# Patient Record
Sex: FEMALE | Race: WHITE | NOT HISPANIC OR LATINO | Employment: FULL TIME | URBAN - METROPOLITAN AREA
[De-identification: names, ages, dates, MRNs, and addresses within clinical notes are randomized per-mention and may not be internally consistent; named-entity substitution may affect disease eponyms.]

---

## 2017-05-24 ENCOUNTER — OFFICE VISIT (OUTPATIENT)
Dept: FAMILY MEDICINE | Facility: CLINIC | Age: 27
End: 2017-05-24

## 2017-05-24 VITALS
WEIGHT: 98.3 LBS | RESPIRATION RATE: 18 BRPM | SYSTOLIC BLOOD PRESSURE: 104 MMHG | OXYGEN SATURATION: 99 % | DIASTOLIC BLOOD PRESSURE: 69 MMHG | BODY MASS INDEX: 18.57 KG/M2 | HEART RATE: 62 BPM

## 2017-05-24 DIAGNOSIS — N83.00 CYST, OVARY, FOLLICULAR: ICD-10-CM

## 2017-05-24 DIAGNOSIS — Z00.00 ROUTINE GENERAL MEDICAL EXAMINATION AT A HEALTH CARE FACILITY: Primary | ICD-10-CM

## 2017-05-24 DIAGNOSIS — Z00.00 HEALTH CARE MAINTENANCE: ICD-10-CM

## 2017-05-24 DIAGNOSIS — M25.50 MULTIPLE JOINT PAIN: ICD-10-CM

## 2017-05-24 DIAGNOSIS — Z00.00 ROUTINE GENERAL MEDICAL EXAMINATION AT A HEALTH CARE FACILITY: ICD-10-CM

## 2017-05-24 LAB
BASOPHILS # BLD AUTO: 0 10E9/L (ref 0–0.2)
BASOPHILS NFR BLD AUTO: 0.6 %
CRP SERPL-MCNC: 3.5 MG/L (ref 0–8)
DIFFERENTIAL METHOD BLD: NORMAL
EOSINOPHIL # BLD AUTO: 0 10E9/L (ref 0–0.7)
EOSINOPHIL NFR BLD AUTO: 0.4 %
ERYTHROCYTE [DISTWIDTH] IN BLOOD BY AUTOMATED COUNT: 12.2 % (ref 10–15)
ERYTHROCYTE [SEDIMENTATION RATE] IN BLOOD BY WESTERGREN METHOD: 9 MM/H (ref 0–20)
HCT VFR BLD AUTO: 41.5 % (ref 35–47)
HGB BLD-MCNC: 13.7 G/DL (ref 11.7–15.7)
IMM GRANULOCYTES # BLD: 0 10E9/L (ref 0–0.4)
IMM GRANULOCYTES NFR BLD: 0.2 %
LYMPHOCYTES # BLD AUTO: 1.8 10E9/L (ref 0.8–5.3)
LYMPHOCYTES NFR BLD AUTO: 33.5 %
MCH RBC QN AUTO: 29.8 PG (ref 26.5–33)
MCHC RBC AUTO-ENTMCNC: 33 G/DL (ref 31.5–36.5)
MCV RBC AUTO: 90 FL (ref 78–100)
MONOCYTES # BLD AUTO: 0.4 10E9/L (ref 0–1.3)
MONOCYTES NFR BLD AUTO: 6.9 %
NEUTROPHILS # BLD AUTO: 3.1 10E9/L (ref 1.6–8.3)
NEUTROPHILS NFR BLD AUTO: 58.4 %
NRBC # BLD AUTO: 0 10*3/UL
NRBC BLD AUTO-RTO: 0 /100
PLATELET # BLD AUTO: 302 10E9/L (ref 150–450)
RBC # BLD AUTO: 4.6 10E12/L (ref 3.8–5.2)
WBC # BLD AUTO: 5.4 10E9/L (ref 4–11)

## 2017-05-24 RX ORDER — DESOGESTREL AND ETHINYL ESTRADIOL 0.15-0.03
1 KIT ORAL DAILY
Qty: 84 TABLET | Refills: 3 | Status: SHIPPED | OUTPATIENT
Start: 2017-05-24 | End: 2018-08-14

## 2017-05-24 ASSESSMENT — PAIN SCALES - GENERAL: PAINLEVEL: MODERATE PAIN (4)

## 2017-05-24 NOTE — PATIENT INSTRUCTIONS
[facility] Medication Refill Request Information:  * Please contact your pharmacy regarding ANY request for medication refills.  ** [facility] Prescription Fax = [phone]  * Please allow 3 business days for routine medication refills.  * Please allow 5 business days for controlled substance medication refills.     [facility] Test Result notification information:  *You will be notified with in 7-10 days of your appointment day regarding the results of your test.  If you are on MyChart you will be notified as soon as the provider has reviewed the results and signed off on them.    [facility] [phone]

## 2017-05-24 NOTE — MR AVS SNAPSHOT
After Visit Summary   5/24/2017    Pita Tee    MRN: [id number]           Patient Information     Date Of Birth          1990        Visit Information        Provider Department      5/24/2017 3:00 PM Alison Plummer MD [facility]        Today's Diagnoses     Routine general medical examination at a health care facility    -  1    Cyst, ovary, follicular        Multiple joint pain        Health care maintenance          Care Instructions    Primary Care Center Medication Refill Request Information:  * Please contact your pharmacy regarding ANY request for medication refills.  ** [facility] Prescription Fax = [phone]  * Please allow 3 business days for routine medication refills.  * Please allow 5 business days for controlled substance medication refills.     [facility] Center Test Result notification information:  *You will be notified with in 7-10 days of your appointment day regarding the results of your test.  If you are on MyChart you will be notified as soon as the provider has reviewed the results and signed off on them.    Primary Care Center [phone]             Follow-ups after your visit        Your next 10 appointments already scheduled     May 24, 2017  4:15 PM CDT   LAB with  LAB   [facility] ([email or web address])    [address] [place] [id number] [phone]           Patient must bring picture ID.  Patient should be prepared to give a urine specimen  Please do not eat 10-12 hours before your appointment if you are coming in fasting for labs on lipids, cholesterol, or glucose (sugar).  Pregnant women should follow their Care Team instructions. Water with medications is okay. Do not drink coffee or other fluids.   If you have concerns about taking  your medications, please ask at office or if scheduling via Scytl, send a message by clicking on Secure Messaging, Message Your Care Team.               Future tests that were ordered for you today     Open Future Orders        Priority Expected Expires Ordered    Rubeola Antibody IgG Routine 5/24/2017 5/24/2018 5/24/2017    Mumps Antibody IgG Routine 5/24/2017 5/24/2018 5/24/2017    Rubella Antibody IgG Quantitative Routine 5/24/2017 5/24/2018 5/24/2017    Erythrocyte sedimentation rate Routine 5/24/2017 5/24/2018 5/24/2017    CRP inflammation Routine 5/24/2017 5/24/2018 5/24/2017    Lyme Confirm IgG by Immunoblot Routine  5/25/2018 5/24/2017    Rheumatoid factor Routine 5/24/2017 5/24/2018 5/24/2017    Antinuclear antibody screen by EIA Routine 5/24/2017 5/24/2018 5/24/2017    CBC with platelets differential Routine 5/24/2017 6/7/2017 5/24/2017            Who to contact     Please call your clinic at [phone] to:    Ask questions about your health    Make or cancel appointments    Discuss your medicines    Learn about your test results    Speak to your doctor   If you have compliments or concerns about an experience at your clinic, or if you wish to file a complaint, please contact [facility] Patient Relations at [phone] or email us at [email or web address]         Additional Information About Your Visit        [employer] Information     [employer] gives you secure access to your electronic health record. If you see a primary care provider, you can also send messages to your care team and make appointments. If you have questions, please call your primary care clinic.  If you do not have a primary care provider, please call [phone] and they will assist you.      [employer] is an electronic gateway that provides easy, online access to your medical records. With [employer], you can request a clinic appointment, read your test results, renew a prescription or communicate with your care team.     To access your existing account, please contact your [facility] or call [phone] for assistance.         Care EveryWhere ID     This is your Care EveryWhere ID. This could be used by other organizations to access your [place] medical records  [id number]        Your Vitals Were     Pulse Respirations Last Period Pulse Oximetry Breastfeeding? BMI (Body Mass Index)    62 18 05/24/2017 (Exact Date) 99% No 18.57 kg/m2       Blood Pressure from Last 3 Encounters:   05/24/17 104/69   06/29/16 107/70   06/22/16 96/62    Weight from Last 3 Encounters:   05/24/17 44.6 kg (98 lb 4.8 oz)   06/29/16 44.7 kg (98 lb 8 oz)   06/22/16 45.2 kg (99 lb 11.2 oz)                 Today's Medication Changes          These changes are accurate as of: 5/24/17  3:55 PM.  If you have any questions, ask your nurse or doctor.               Stop taking these medicines if you haven't already. Please contact your care team if you have questions.     hydrOXYzine 25 MG tablet   Commonly known as:  ATARAX   Stopped by:  Alison Plummer MD PhD           sertraline 50 MG tablet   Commonly known as:  ZOLOFT   Stopped by:  Alison Plummer MD PhD                Where to get your medicines      These medications were sent to [address] [address]     Phone:  [phone]     desogestrel-ethinyl estradiol 0.15-30 MG-MCG per tablet                Primary Care Provider    Physician No Ref-Primary       No address on file        Thank you!     Thank you for choosing [facility]  for your care. Our goal is always to provide you with excellent care. Hearing back from our patients is one way we can continue to improve our services. Please take a few minutes to complete the written survey that you may receive in the mail after your visit with us. Thank you!             Your Updated Medication List - Protect others around you: Learn how to safely use, store and throw away your medicines at www.disposemymeds.org.          This list is accurate as of:  5/24/17  3:55 PM.  Always use your most recent med list.                   Brand Name Dispense Instructions for use    desogestrel-ethinyl estradiol 0.15-30 MG-MCG per tablet    APRI    84 tablet    Take 1 tablet by mouth daily

## 2017-05-24 NOTE — NURSING NOTE
Chief Complaint   Patient presents with     Physical     Patient is here for annual physical.      Arthritis     Patient would like to be checked for osteoarthritis.      Angelica Aguilar LPN at 3:08 PM on 5/24/2017.

## 2017-05-24 NOTE — PROGRESS NOTES
REASON for VISIT:annual - joint pain    HPI   Pita Tee is a 27 year old female who is here for a preventive examination.She is Gr0P0 - LMP was 5/23/17.  On BCP - getting periods with them.  Wants to stay on them.  No vaginal itching, burning or discharge.  Last pap was 6/2016 and normal.  STI testing negative.    She has been having knee discomfort for a few months - getting worse.  Almost every day. Also started to have pain in joints both hands.  No joint swelling. No cracking of knees - no instability.  No new exercise.  Taking ibuprofen - takes 2 at a time and helps some.  No trauma.  Sister has osteoarthritis  - age 17.  Mother has RA dx was last year.      Hx of FABRICIO.  Now off zoloft - didn't feel it helped.  Is doing CBT and seems to be working.  No panic attacks     Patient also questioning her measles, mumps and rubella status. Has had 2 MMR's last one in 2000    Past Medical History:   Diagnosis Date     Dysmenorrhea      Generalised anxiety disorder     Dx age 17; on zoloft for 2 years     IBS (irritable bowel syndrome)     constipation predominant     Migraines      Nephrolithiasis          Current Outpatient Prescriptions   Medication Sig Dispense Refill     desogestrel-ethinyl estradiol (APRI) 0.15-30 MG-MCG per tablet Take 1 tablet by mouth daily 84 tablet 3       Social History   Substance Use Topics     Smoking status: Never Smoker     Smokeless tobacco: Never Used     Alcohol use Yes      Comment: social     Social History     Social History     Marital status: Single     Spouse name: N/A     Number of children: N/A     Years of education: N/A     Occupational History     retail clerical [employer]     Social History Main Topics     Smoking status: Never Smoker     Smokeless tobacco: Never Used     Alcohol use Yes      Comment: social     Drug use: No     Sexual activity: Not Currently     Partners: Female, Male     Birth control/ protection: OCP     Other Topics Concern     Caffeine Concern Yes      2-3 cups/day     Sleep Concern Yes     trouble falling asleep     Stress Concern No     Exercise Yes     walks to work - 1 mile/day      Bike Helmet Yes     Seat Belt Yes     Social History Narrative    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 Maternal Grandfather      MI     DIABETES Paternal Grandfather      Arthritis Sister      Anxiety Disorder Mother      Neurologic Disorder Mother      Migraines     Neurologic Disorder Maternal Grandmother      Possible Lewy body     Breast Cancer Other      CANCER Other      Ovarian          Review Of Systems  General:no fever, no chills  Skin: negative  Eyes: negative  Ears/Nose/Throat: nasal congestion, environmental allergies   Respiratory: No shortness of breath, dyspnea on exertion, cough, or hemoptysis  Cardiovascular: negative  Gastrointestinal: negative  Genitourinary: negative  Musculoskeletal: as above, joint pain and joint stiffness  Neurologic: negative  Psychiatric: hx of anxiety - off meds now  Hematologic/Lymphatic/Immunologic: negative  Endocrine: negative    OBJECTIVE:  /69  Pulse 62  Resp 18  Wt 44.6 kg (98 lb 4.8 oz)  LMP 05/24/2017 (Exact Date)  SpO2 99%  Breastfeeding? No  BMI 18.57 kg/m2  Gen:  Pleasant young woman in NAD  HEENT: PERRL fundi benign  Ears clear with good light reflex.  OP MMM no lesions. Teeth crowded and in poor condition  No drainage  Neck  Supple.  Thyroid not palpable   No carotid bruits.  No LAD  CVS exam: S1, S2 normal, no murmur, click, rub or gallop, regular rate and rhythm, chest is clear without rales or wheezing, no pedal edema, no JVD, no hepatosplenomegaly.  Abd Soft ND NT  BS present  No masses or HSM  Ext   Good pulses. No edema  Musculoskeletal: small joints both hands minimal swelling - good grasp and flexion and extension of fingers, wrists - no swelling full ROM, knees minimal swelling  - full ROM  - spine is straight,  no deformity  BREAST:  normal without suspicious masses, skin changes or axillary nodes, symmetric fibrous changes in both upper outer quadrants   Pelvic exam: deferred - having menses and exam and pap last year were normal and no symptoms - pt did not want a pelvic   Rectal exam: deferred   Neuro: Neurological exam reveals normal without focal findings, mental status, speech normal, alert and oriented x iii, NIKO, fundi are normal, cranial nerves 2-12 intact, muscle tone and strength normal and symmetric, reflexes normal and symmetric.      ASSESSMENT: Young female comes in for annual and has concern for joint pain in hands and knees    (Z00.00) Routine general medical examination at a health care facility  (primary encounter diagnosis)  Comment: pap not indicated, due in 2 yrs - STI testing not indicated and pt does not want it,  talked about calcium and taking a multivit with folate. Immunizations UTD, wants to stay on BC - refill   Plan: desogestrel-ethinyl estradiol (APRI) 0.15-30         MG-MCG per tablet,     (N83.00) Cyst, ovary, follicular  Comment: hx of a cyst - wants to stay on BC  Plan: desogestrel-ethinyl estradiol (APRI) 0.15-30         MG-MCG per tablet,     (M25.50) Multiple joint pain  Comment: questionable etiology - possible RA, or lymes although no tick bite - will do several inflammatory markers.   Plan:, Erythrocyte sedimentation         rate, CRP inflammation, Lyme Confirm IgG by         Immunoblot, Rheumatoid factor, Antinuclear         antibody screen by EIA, CBC with platelets         Differential,    (Z00.00) Health care maintenance  Comment:  Will check ser  y for MMR. Other immunizations UTD   Plan: Rubella Antibody IgG         Quantitative, Rubeola Antibody IgG, Mumps         Antibody IgG  All serology is positive to suggest she is immune     Alison Plummer MD, PhD

## 2017-05-25 LAB
ANA SER QL IA: 1.1
MEV IGG SER QL IA: 4.8 AI (ref 0–0.8)
MUV IGG SER QL IA: 2.2 AI (ref 0–0.8)
RHEUMATOID FACT SER NEPH-ACNC: <20 IU/ML (ref 0–20)
RUBV IGG SERPL IA-ACNC: 110 IU/ML

## 2017-05-27 LAB — B BURGDOR IGG SER QL IB: NORMAL

## 2017-05-31 ENCOUNTER — MYC MEDICAL ADVICE (OUTPATIENT)
Dept: FAMILY MEDICINE | Facility: CLINIC | Age: 27
End: 2017-05-31

## 2018-07-31 ASSESSMENT — ENCOUNTER SYMPTOMS
PARALYSIS: 0
MYALGIAS: 1
NUMBNESS: 0
SEIZURES: 0
JOINT SWELLING: 0
LOSS OF CONSCIOUSNESS: 0
TREMORS: 0
DISTURBANCES IN COORDINATION: 0
HEADACHES: 1
DIZZINESS: 0
BACK PAIN: 1
MUSCLE CRAMPS: 0
NECK PAIN: 0
MUSCLE WEAKNESS: 0
WEAKNESS: 0
MEMORY LOSS: 0
STIFFNESS: 0
SPEECH CHANGE: 0
TINGLING: 0
ARTHRALGIAS: 1

## 2018-08-14 ENCOUNTER — OFFICE VISIT (OUTPATIENT)
Dept: FAMILY MEDICINE | Facility: CLINIC | Age: 28
End: 2018-08-14
Payer: COMMERCIAL

## 2018-08-14 VITALS
BODY MASS INDEX: 17.85 KG/M2 | DIASTOLIC BLOOD PRESSURE: 66 MMHG | WEIGHT: 97 LBS | SYSTOLIC BLOOD PRESSURE: 104 MMHG | HEART RATE: 69 BPM | HEIGHT: 62 IN

## 2018-08-14 DIAGNOSIS — N83.00 CYST, OVARY, FOLLICULAR: ICD-10-CM

## 2018-08-14 DIAGNOSIS — Z00.00 ROUTINE GENERAL MEDICAL EXAMINATION AT A HEALTH CARE FACILITY: ICD-10-CM

## 2018-08-14 DIAGNOSIS — M25.50 MULTIPLE JOINT PAIN: ICD-10-CM

## 2018-08-14 DIAGNOSIS — Z00.00 HEALTH CARE MAINTENANCE: ICD-10-CM

## 2018-08-14 RX ORDER — DESOGESTREL AND ETHINYL ESTRADIOL 0.15-0.03
1 KIT ORAL DAILY
Qty: 84 TABLET | Refills: 3 | Status: SHIPPED | OUTPATIENT
Start: 2018-08-14 | End: 2019-08-19

## 2018-08-14 ASSESSMENT — PAIN SCALES - GENERAL: PAINLEVEL: NO PAIN (0)

## 2018-08-14 NOTE — PROGRESS NOTES
[facility]  Primary Care Center   Baldemar Mercado MD  08/14/2018      Chief Complaint:   Physical       History of Present Illness:   Pita Tee is a 28 year old female with a history of anxiety who presents for a physical. She denies urinary, bowel or other symptoms at this time.    Concerns discussed:   -The patient is starting an exercise program. She is here with concerns for intermittent knee pain which she describes as diffuse. The pain is worse with prolonged standing or walking. She has occasional stiffness in the morning    -She was told she has elevated pressure in her eyes and is concerned that it may be related to her migraines. For her migraines she uses Advil, ice and rest. She has had migraines since 13 and has a major episode once per month. She has frequent tension headaches for which she uses caffeine.     -Birth control refill. She uses [facility]    Review of Systems:   Pertinent items are noted in HPI, remainder of complete ROS is negative.      Active Medications:     Current Outpatient Prescriptions:      desogestrel-ethinyl estradiol (APRI) 0.15-30 MG-MCG per tablet, Take 1 tablet by mouth daily, Disp: 84 tablet, Rfl: 3     Ibuprofen (ADVIL PO), Take by mouth as needed for moderate pain, Disp: , Rfl:      [DISCONTINUED] desogestrel-ethinyl estradiol (APRI) 0.15-30 MG-MCG per tablet, Take 1 tablet by mouth daily, Disp: 84 tablet, Rfl: 3      Allergies:   Macrobid [nitrofurantoin]      Past Medical History:  Dysmenorrhea  Generalized anxiety disorder  Irritable bowel syndrome  Migraines  Nephrolithiasis     Past Surgical History:  C removal of kidney stone    Family History:   Hypertension - maternal grandfather  Coronary artery disease - maternal grandfather  Diabetes - paternal grandfather  Arthritis - sister  Anxiety disorder - mother  Neurologic disorder - mother  Breast cancer - other  Ovarian cancer - other     Social History:   Smoking status: never smoker  Smokeless tobacco:  "never  Alcohol use: socially  The patient formerly worked at the [employer] and is switching jobs     Physical Exam:   /66  Pulse 69  Ht 1.562 m (5' 1.5\")  Wt 44 kg (97 lb)  LMP 08/07/2018  Breastfeeding? No  BMI 18.03 kg/m2   Constitutional: Alert. In no distress.  Head: Normocephalic. No masses, lesions, tenderness or abnormalities.  ENT: No neck nodes or sinus tenderness.  Cardiovascular: RRR. No murmurs, clicks, gallops, or rub.  Respiratory: Clear to auscultation bilaterally, no wheezes or crackles.  Gastrointestinal: Abdomen soft. Non-tender. BS normal. No masses or organomegaly.  Musculoskeletal: Extremities normal. No gross deformities noted. Normal muscle tone.  Skin: No suspicious lesions. No rashes.  Neurologic: Gait normal. Reflexes normal and symmetric. Sensation grossly WNL.  Psychiatric: Mentation appears normal. Normal affect.   Hematologic/Lymphatic/Immunologic: Normal cervical lymph nodes.        Assessment and Plan:      Multiple joint pain--see walk in [facility] (described how to see) prn    Health care maintenance  Lipids and sugar normal 2 years ago. She has just started an exercise program with a . Tetanus up to date. The patient will continue to follow with her eye doctor for routine exams and to monitor eye pressure     Birth Control  Tolerates birth control well with no side effects. Recheck in 1 year, PAP smear then.   - desogestrel-ethinyl estradiol (APRI) 0.15-30 MG-MCG per tablet  Dispense: 84 tablet; Refill: 3    Migraines and tension headaches  She manages with caffeine and motrin. I did offer a course of physical therapy and she will think about it.     Follow-up: As needed     Scribe Disclosure:   I, Hector Sanchez, am serving as a scribe to document services personally performed by Baldemar Mercado MD at this visit, based upon the provider's statements to me. All documentation has been reviewed by the aforementioned provider prior to being entered into the " official medical record.     Portions of this medical record were completed by a scribe. UPON MY REVIEW AND AUTHENTICATION BY ELECTRONIC SIGNATURE, this confirms (a) I performed the applicable clinical services, and (b) the record is accurate.       Answers for HPI/ROS submitted by the patient on 7/31/2018   General Symptoms: No  Skin Symptoms: No  HENT Symptoms: No  EYE SYMPTOMS: No  HEART SYMPTOMS: No  LUNG SYMPTOMS: No  INTESTINAL SYMPTOMS: No  URINARY SYMPTOMS: No  GYNECOLOGIC SYMPTOMS: No  BREAST SYMPTOMS: No  SKELETAL SYMPTOMS: Yes  BLOOD SYMPTOMS: No  NERVOUS SYSTEM SYMPTOMS: Yes  MENTAL HEALTH SYMPTOMS: No  Back pain: Yes  Muscle aches: Yes  Neck pain: No  Swollen joints: No  Joint pain: Yes  Bone pain: No  Muscle cramps: No  Muscle weakness: No  Joint stiffness: No  Bone fracture: No  Trouble with coordination: No  Dizziness or trouble with balance: No  Fainting or black-out spells: No  Memory loss: No  Headache: Yes  Seizures: No  Speech problems: No  Tingling: No  Tremor: No  Weakness: No  Difficulty walking: No  Paralysis: No  Numbness: No  Baldemar Mercado MD

## 2018-08-14 NOTE — MR AVS SNAPSHOT
"              After Visit Summary   8/14/2018    Pita Tee    MRN: [id number]           Patient Information     Date Of Birth          1990        Visit Information        Provider Department      8/14/2018 11:45 AM Baldemar Mercado MD [facility]        Today's Diagnoses     Cyst, ovary, follicular        Routine general medical examination at a health care facility        Multiple joint pain        Health care maintenance           Follow-ups after your visit        Who to contact     Please call your clinic at [phone] to:    Ask questions about your health    Make or cancel appointments    Discuss your medicines    Learn about your test results    Speak to your doctor            Additional Information About Your Visit        [employer] Information     [email or web address] gives you secure access to your electronic health record. If you see a primary care provider, you can also send messages to your care team and make appointments. If you have questions, please call your primary care clinic.  If you do not have a primary care provider, please call [phone] and they will assist you.      [email or web address] is an electronic gateway that provides easy, online access to your medical records. With [email or web address], you can request a clinic appointment, read your test results, renew a prescription or communicate with your care team.     To access your existing account, please contact your [facility] or call [phone] for assistance.        Care EveryWhere ID     This is your Care EveryWhere ID. This could be used by other organizations to access your [place] medical records  [id number]        Your Vitals Were     Pulse Height Last Period Breastfeeding? BMI (Body Mass Index)       69 1.562 m (5' 1.5\") 08/07/2018 No 18.03 kg/m2        Blood Pressure from Last 3 Encounters:   08/14/18 104/66   05/24/17 104/69   06/29/16 107/70    Weight from Last 3 Encounters:   08/14/18 44 kg (97 " lb)   05/24/17 44.6 kg (98 lb 4.8 oz)   06/29/16 44.7 kg (98 lb 8 oz)              Today, you had the following     No orders found for display         Where to get your medicines      These medications were sent to [facility] [place], [place] - [address] [place] [address], [facility] [address]     Phone:  [phone]     desogestrel-ethinyl estradiol 0.15-30 MG-MCG per tablet          Primary Care Provider Fax #    Physician No Ref-Primary [phone]       No address on file        Equal Access to Services     [facility] Hospital[facility] C : Hadii dede swanson Somike, waaxda mary, qaybtarah kaalmayana maldonado, sheree kat . [facility] [phone].    ATENCIÓN: Si habla español, tiene a dalton disposición servicios gratuitos de asistencia lingüística. Ll[facility] [phone].    We comply with applicable federal civil rights laws and [place] laws. We do not discriminate on the basis of race, color, national origin, age, disability, sex, sexual orientation, or gender identity.            Thank you!     Thank you for choosing [facility]  for your care. Our goal is always to provide you with excellent care. Hearing back from our patients is one way we can continue to improve our services. Please take a few minutes to complete the written survey that you may receive in the mail after your visit with us. Thank you!             Your Updated Medication List - Protect others around you: Learn how to safely use, store and throw away your medicines at www.disposemymeds.org.          This list is accurate as of 8/14/18 11:59 AM.  Always use your most recent med list.                   Brand Name Dispense Instructions for use Diagnosis    ADVIL PO      Take by mouth as needed for moderate pain        desogestrel-ethinyl estradiol 0.15-30 MG-MCG per tablet    APRI    84 tablet    Take 1 tablet by mouth daily    Cyst, ovary, follicular, Routine general medical examination  at a health care facility, Multiple joint pain, Health care maintenance

## 2019-04-01 ENCOUNTER — OFFICE VISIT (OUTPATIENT)
Dept: FAMILY MEDICINE | Facility: CLINIC | Age: 29
End: 2019-04-01
Payer: COMMERCIAL

## 2019-04-01 VITALS
HEART RATE: 78 BPM | WEIGHT: 98.5 LBS | OXYGEN SATURATION: 97 % | BODY MASS INDEX: 18.31 KG/M2 | SYSTOLIC BLOOD PRESSURE: 106 MMHG | DIASTOLIC BLOOD PRESSURE: 74 MMHG

## 2019-04-01 DIAGNOSIS — G47.10 HYPERSOMNOLENCE: Primary | ICD-10-CM

## 2019-04-01 RX ORDER — FEXOFENADINE HCL 180 MG/1
180 TABLET ORAL PRN
COMMUNITY
End: 2022-08-08

## 2019-04-01 ASSESSMENT — PAIN SCALES - GENERAL: PAINLEVEL: NO PAIN (0)

## 2019-04-01 NOTE — PROGRESS NOTES
[facility]  Primary Care Center   Baldemar Mercado MD  04/01/2019      Chief Complaint:   Fatigue    History of Present Illness:   Pita Tee is a 29 year old female with a history of generalized anxiety disorder, irritable bowel syndrome, migraines, nephrolithiasis, and dysmenorrhea who presents for evaluation of sleep issues.    Non-restorative sleep  The patient reports she sleeps 10 hours/night and wakes groggy, which is more than she feels she should sleep, and has been ongoing for at least 12 years. It takes an hour or more to fall asleep, per patient. She experiences uncommon night waking, but is able to fall back asleep following these. She reports taking a breath (but not gasp) when waking. She reports a family history of sleep apnea. She has not been told that she snores, but has not had a roommate in years. 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 She drinks alcohol rarely and denies drug use. She does not feel depressed. She has not had sinus or nasal, adenoid issues or surgeries. She feels sleepy throughout the day. She denies falling asleep in traffic or unintentionally. She reports anxious thoughts while falling asleep. She denies weight changes, chills, fever.    Review of Systems:   Pertinent items are noted in HPI, remainder of complete ROS is negative.      Active Medications:      desogestrel-ethinyl estradiol (APRI) 0.15-30 MG-MCG per tablet, Take 1 tablet by mouth daily, Disp: 84 tablet, Rfl: 3     fexofenadine (ALLEGRA) 180 MG tablet, Take 180 mg by mouth as needed for allergies, Disp: , Rfl:      Ibuprofen (ADVIL PO), Take by mouth as needed for moderate pain, Disp: , Rfl:       Allergies:   Macrobid [nitrofurantoin]      Past Medical History:  Dysmenorrhea  Generalized anxiety disorder (age 17), on Zoloft for 2 years  Irritable bowel syndrome, constipation predominant  Migraines  Nephrolithiasis     Past Surgical  History:  Removal of kidney stone    Family History:   Mother - anxiety disorder, migraines  Maternal grandfather - hypertension, MI, CAD  Maternal grandmother - possible Lewy body  Paternal grandfather - diabetes  Sister - arthritis     Social History:   The patient was alone.  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 In no distress.  Head: The scalp, face, and head appear normal.  Musculoskeletal: Extremities appear normal. No gross deformities noted.   Neurologic: Gait normal. Speech is normal and fluent.  Psychiatric: Mentation appears normal. Normal affect.     PHQ-9: 5  FABRICIO-7: 9    Past laboratory results  Component      Latest Ref Rng & Units 1/26/2016   WBC      4.0 - 11.0 10e9/L 7.8   RBC Count      3.8 - 5.2 10e12/L 4.08   Hemoglobin      11.7 - 15.7 g/dL 11.9   Hematocrit      35.0 - 47.0 % 36.4   MCV      78 - 100 fl 89   MCH      26.5 - 33.0 pg 29.2   MCHC      31.5 - 36.5 g/dL 32.7   RDW      10.0 - 15.0 % 12.6   Platelet Count      150 - 450 10e9/L 268   Diff Method       Automated Method   % Neutrophils      % 72.1   % Lymphocytes      % 20.2   % Monocytes      % 6.3   % Eosinophils      % 1.0   % Basophils      % 0.3   % Immature Granulocytes      % 0.1   Nucleated RBCs      0 /100 0   Absolute Neutrophil      1.6 - 8.3 10e9/L 5.6   Absolute Lymphocytes      0.8 - 5.3 10e9/L 1.6   Absolute Monocytes      0.0 - 1.3 10e9/L 0.5   Absolute Eosinophils      0.0 - 0.7 10e9/L 0.1   Absolute Basophils      0.0 - 0.2 10e9/L 0.0   Abs Immature Granulocytes      0 - 0.4 10e9/L 0.0   Absolute Nucleated RBC       0.0   Sodium      133 - 144 mmol/L 139   Potassium      3.4 - 5.3 mmol/L 4.0   Chloride      94 - 109 mmol/L 106   Carbon Dioxide      20 - 32 mmol/L 29    Anion Gap      3 - 14 mmol/L 4   Glucose      70 - 99 mg/dL 82   Urea Nitrogen      7 - 30 mg/dL 13   Creatinine      0.52 - 1.04 mg/dL 0.88   GFR Estimate      >60 mL/min/1.7m2 77   GFR Estimate If Black      >60 mL/min/1.7m2 >90 . . .   Calcium      8.5 - 10.1 mg/dL 8.4 (L)   Bilirubin Total      0.2 - 1.3 mg/dL 0.6   Albumin      3.4 - 5.0 g/dL 4.0   Protein Total      6.8 - 8.8 g/dL 7.2   Alkaline Phosphatase      40 - 150 U/L 38 (L)   ALT      0 - 50 U/L 14   AST      0 - 45 U/L 10   TSH      0.40 - 4.00 mU/L 0.57        Assessment and Plan:  [id number]  There could be an element of mood disturbance though that may be an element of primary sleep problem as she has felt this way for 12 years. CBC, thyroid and Comprehensive metabolics are normal I did not repeat labs today. If her sleep test is normal I advised her to follow up to see me to reconsider labs and pursuing mood disturbance.  - SLEEP EVALUATION & MANAGEMENT REFERRAL - ADULT -Other (Respond in commments) ([facility])       Follow-up: Data Unavailable         Scribe Disclosure:  I, José Escalona, am serving as a scribe to document services personally performed by Baldemar Mercado MD at this visit, based upon the provider's statements to me. All documentation has been reviewed by the aforementioned provider prior to being entered into the official medical record.     Portions of this medical record were completed by a scribe. UPON MY REVIEW AND AUTHENTICATION BY ELECTRONIC SIGNATURE, this confirms (a) I performed the applicable clinical services, and (b) the record is accurate.   Baldemar Mercado MD

## 2019-04-01 NOTE — NURSING NOTE
Chief Complaint   Patient presents with     Fatigue     Pt is c/o of exessive fatigue and sleeping a lot        Carol Rainey, Clinic EMT at 9:43 AM on 4/1/2019

## 2019-04-01 NOTE — PATIENT INSTRUCTIONS
Primary Care Center Phone Number: [phone]   Primary Care Center Medication Refill Request Information:  * Please contact your pharmacy regarding ANY request for medication refills.  ** [facility] Prescription Fax = [phone]  * Please allow 3 business days for routine medication refills.  * Please allow 5 business days for controlled substance medication refills.     Primary [facility] Test Result notification information:  *You will be notified with in 7-10 days of your appointment day regarding the results of your test.  If you are on MyChart you will be notified as soon as the provider has reviewed the results and signed off on them.

## 2019-05-03 ENCOUNTER — PRE VISIT (OUTPATIENT)
Dept: SLEEP MEDICINE | Facility: CLINIC | Age: 29
End: 2019-05-03

## 2019-05-03 NOTE — TELEPHONE ENCOUNTER
"  1.  Reason for the visit:The patient reports she sleeps 10 hours/night and wakes groggy, which is more than she feels she should sleep, and has been ongoing for at least 12 years. It takes an hour or more to fall asleep, per patient. She experiences uncommon night waking, but is able to fall back asleep following these. She reports taking a breath (but not gasp) when waking. She reports a family history of sleep apnea. She has not been told that she snores, but has not had a roommate in years. 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 She drinks alcohol rarely and denies drug use. She does not feel depressed. She has not had sinus or nasal, adenoid issues or surgeries. She feels sleepy throughout the day. She denies falling asleep in traffic or unintentionally. She reports anxious thoughts while falling asleep. She denies weight changes, chills, fever.  2.  Referring provider and clinic name:  Dr. Mercado [facility]  3.  Previous Sleep Doctor or Pulmonlogist (clinic name)?  None  4.  Records, Procedures, Imaging, and Labs (see below)  No records to obtain        All NOTES from previous office visits that pertain to why they are being seen in the Sleep Center    Previous Sleep Studies, Chest CT, Echos and reports that pertain to why they are seeing Sleep Center    All Sleep records that have been done in the last 2 years that pertain to why they are seeing Sleep Center            Are they being seen for continuation of care for Cpap/Bipap/Avap/Trilogy/Dental Device? none    If yes to above Who and Where was Device issued/currently getting supplies from? na    Are you currently on \"Supplemental Oxygen\" during the day or night?   na                                                                                                                                                      Please remind pt to bring Cpap machine and ask to arrive " "15 minutes early to appointment due traffic and congestion                                                 5. Pt Sleep Center Packet received Pt has completed and will bring to appt    Yes: \"please make sure that you bring this to your appointment completed, either the doctor will not see you until this completed or you may be asked to reschedule your appointment.\"     No: mail or email to the pt and explain, \"please make sure that you bring this to your appointment completed, either the doctor will not see you until this completed or you may be asked to reschedule your appointment.\"     ~If pt coming early to fill packet out, ask that they come 30 minutes prior to their appointment~     6. Has the pt's medication list been updated and preferred pharmacy added?     7. Has the allergy list been reviewed?    \"Thank you for choosing [facility] and we look forward to seeing you at your upcoming appointment\"     "

## 2019-05-06 ENCOUNTER — OFFICE VISIT (OUTPATIENT)
Dept: SLEEP MEDICINE | Facility: CLINIC | Age: 29
End: 2019-05-06
Attending: FAMILY MEDICINE
Payer: COMMERCIAL

## 2019-05-06 VITALS
WEIGHT: 97 LBS | DIASTOLIC BLOOD PRESSURE: 69 MMHG | RESPIRATION RATE: 16 BRPM | HEIGHT: 62 IN | BODY MASS INDEX: 17.85 KG/M2 | HEART RATE: 69 BPM | OXYGEN SATURATION: 96 % | SYSTOLIC BLOOD PRESSURE: 105 MMHG

## 2019-05-06 DIAGNOSIS — G47.10 HYPERSOMNOLENCE: ICD-10-CM

## 2019-05-06 DIAGNOSIS — F51.04 PSYCHOPHYSIOLOGICAL INSOMNIA: Primary | ICD-10-CM

## 2019-05-06 DIAGNOSIS — R53.83 FATIGUE, UNSPECIFIED TYPE: ICD-10-CM

## 2019-05-06 PROCEDURE — 99205 OFFICE O/P NEW HI 60 MIN: CPT | Performed by: INTERNAL MEDICINE

## 2019-05-06 ASSESSMENT — MIFFLIN-ST. JEOR: SCORE: 1110.3

## 2019-05-06 NOTE — PROGRESS NOTES
Sleep Consultation Note:    Date on this visit: 5/6/2019    Pita Tee is sent by Baldemar Mercado for a sleep consultation regarding nonrestorative sleep  Primary Physician: Baldemar Mercado     Chief complaint: Nonrestorative sleep(sleep 9 to 10 hours per night still do not feel rested)    Pita Tee 29 year old with PMH migraine headaches, generalized anxiety disorder,  who complains of nonrestorative sleep(sleep 9 to 10 hours per night still do not feel rested)  This has been going on for at least 12 years, since she was in high school.  She has not had a previous sleep study.    She sleeps alone and hence there isn't anyone to report whether or not she snores or has any observed apneas during sleep.  She denies snort arousals or awakenings due to gasping for air or choking.  She reports morning headaches almost on a daily basis.  She reports nasal congestion and dry mouth.  She  is a mouth breather.  She sleeps on her back and on her sides.    She works as a , works from home and her work hours can be between 2-10 PM or 3 to 11 PM.    During work days , she goes to bed at 11:30 PM and wakes up at 10:30 AM.  During weekends, she goes to bed at 10:30 PM and wakes up at 10 AM.  She reports difficulty falling asleep.   It takes 1 to 1-1/2 hours for her to fall asleep.  Active mind, anxiety, ruminating thoughts and chronic pain(upper back and headaches) affect her sleep.  She also reports some  anxiety about her ability to fall asleep.  Depression was a concern in the past, but not currently.  She reports 0-3 nocturnal awakenings due to bad or weird dreams, or wrong temperature.  She is able to resume sleep within 1 to 2 minutes after the nocturnal awakening.     On average she reports getting 9 to 10 hours of sleep per night.   She would be a night person if she is left on her own, and reports that when she was in high school and college, not working she would go to bed at 2 AM and wake up  at 11 AM.    She reports nonrestorative sleep and fatigue but denies excessive daytime sleepiness.  She endorses an [place] sleepiness score of 7 out of 24.  She denies drowsiness while driving.  She reports that the fatigue makes her work somewhat sloppy.  She naps once a week for 1 to 1-1/2 hours and does not feel rested upon awakening from nap  She does not use electronics in bed.    She reports have habitual foot tapping which she can consciously control and her symptoms do not suggest RLS.    She reports occasional nightmares.  She used to sleep walk  during childhood but not as an adult.  She denies sleepwalking or dream enactment behavior.    She reports bruxism does not use a mouthguard, is getting dental work done.    She denies cataplexy or hallucinations but reports occasional episodes of sleep paralysis.      Social History  Pita currently works as a .  Work schedule is as follows:  2-10 PM or 3 to 11 PM.    She does drink caffeine, 3 cups of coffee /day . Last caffeine intake is not within 6 hours of bed time.  She drinks alcohol very occasionally.  She has used alcohol a couple times about 2 years ago as a sleep aid but realized that it worsened her sleep and since then she stopped using it a sleep aid.    Patient is a never smoker.  Patient does not use drugs.    Allergies:    Allergies   Allergen Reactions     Macrobid [Nitrofurantoin]      headache         Medications:    Current Outpatient Medications   Medication Sig Dispense Refill     desogestrel-ethinyl estradiol (APRI) 0.15-30 MG-MCG per tablet Take 1 tablet by mouth daily 84 tablet 3     fexofenadine (ALLEGRA) 180 MG tablet Take 180 mg by mouth as needed for allergies       Ibuprofen (ADVIL PO) Take by mouth as needed for moderate pain         Problem List:  There are no active problems to display for this patient.       Past Medical/Surgical History:  Past Medical History:   Diagnosis Date     Dysmenorrhea      Generalised  anxiety disorder     Dx age 17; on zoloft for 2 years     IBS (irritable bowel syndrome)     constipation predominant     Migraines      Nephrolithiasis      Past Surgical History:   Procedure Laterality Date     C REMOVAL OF KIDNEY STONE         Social History:  Social History     Socioeconomic History     Marital status: Single     Spouse name: Not on file     Number of children: Not on file     Years of education: Not on file     Highest education level: Not on file   Occupational History     Occupation: retail clerical     Employer: [employer]   Social Needs     Financial resource strain: Not on file     Food insecurity:     Worry: Not on file     Inability: Not on file     Transportation needs:     Medical: Not on file     Non-medical: Not on file   Tobacco Use     Smoking status: Never Smoker     Smokeless tobacco: Never Used   Substance and Sexual Activity     Alcohol use: Yes     Comment: social     Drug use: No     Sexual activity: Not Currently     Partners: Female, Male     Birth control/protection: OCP   Lifestyle     Physical activity:     Days per week: Not on file     Minutes per session: Not on file     Stress: Not on file   Relationships     Social connections:     Talks on phone: Not on file     Gets together: Not on file     Attends Restoration service: Not on file     Active member of club or organization: Not on file     Attends meetings of clubs or organizations: Not on file     Relationship status: Not on file     Intimate partner violence:     Fear of current or ex partner: Not on file     Emotionally abused: Not on file     Physically abused: Not on file     Forced sexual activity: Not on file   Other Topics Concern     Parent/sibling w/ CABG, MI or angioplasty before 65F 55M? 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 Maternal Grandfather         MI     Diabetes Paternal Grandfather      Arthritis Sister      Anxiety Disorder Mother      Neurologic Disorder Mother         Migraines     Neurologic Disorder Maternal Grandmother         Possible Lewy body     Breast Cancer Other      Cancer Other         Ovarian       Review of Systems:  A complete review of systems reviewed by me is negative with the exeption of what has been mentioned in the history of present illness and symptoms listed below, highlighted in red:  CONSTITUTIONAL: NEGATIVE for weight gain/loss, fever, chills, sweats or night sweats  EYES: NEGATIVE for changes in vision, blind spots, double vision.  ENT: NEGATIVE for ear pain, sore throat, sinus pain, post-nasal drip, runny nose, bloody nose  CARDIAC: NEGATIVE for fast heartbeats or fluttering in chest, chest pain or pressure, breathlessness when lying flat, swollen legs or swollen feet.  NEUROLOGIC:  headaches, NEGATIVE for weakness or numbness in the arms or legs.  DERMATOLOGIC: NEGATIVE for rashes, new moles or change in mole(s)  PULMONARY: NEGATIVE SOB at rest, SOB with activity, dry cough, productive cough, coughing up blood, wheezing or whistling when breathing.    GASTROINTESTINAL: NEGATIVE for nausea or vomitting, loose or watery stools, fat or grease in stools, constipation, abdominal pain, bowel movements black in color or blood noted.  GENITOURINARY: NEGATIVE for pain during  "urination, blood in urine, urinating more frequently than usual  MUSCULOSKELETAL: Chronic upper back pain and headaches  ENDOCRINE: NEGATIVE for increased thirst or urination, diabetes.  LYMPHATIC: NEGATIVE for swollen lymph nodes, lumps or bumps in the breasts or nipple discharge.  PSYCHE:  Anxiety; NEGATIVE for depression,    Physical Examination:  Vitals: /69   Pulse 69   Resp 16   Ht 1.562 m (5' 1.5\")   Wt 44 kg (97 lb)   SpO2 96%   BMI 18.03 kg/m    BMI= Body mass index is 18.03 kg/m .    Neck Cir (cm): 29 cm    [place] Total Score 5/6/2019   Total score - [place] 7       General: No apparent distress, appropriately groomed  Head: Normocephalic, atraumatic  Eyes: no icterus, PERRL  Nose: Hypertrophied inferior nasal turbinate with decreased airflow through the right nostril.  Mouth: op pink and moist, teeth:overbite  Orophraynx: Opening is narrowed, uvula: Short   Mallampati Class: II, narrow Velopharynx   Tonsillar Stage: 1+   Neck: Supple, Circumference: 11-1/2 inches  Cardiac: Regular rate and rhythm  Chest: Symmetric air movement, lungs clear to auscultation bilaterally  Musculoskeletal:no edema noted  Skin: Warm, dry, intact  Psych: Mood pleasant, affect congruent  Neuro:  Mental status: Awake, alert, attentive, oriented.  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  Strongly encouraged to follow good sleep hygiene/behavioral techniques. She would be a good candidate for Cognitive Behavioral Therapy, for the psychophysiological insomnia. Referral to sleep psychologist has been placed. Patient will bring sleep logs to the CBT-I appointment.   We discussed stimulus control measures.  She was instructed to go to bed closer " "to 2 AM which  is her habitual sleep time,  minimizing exposure to bright lights at least a couple hours before the bedtime and avoid mind stimulating activities before bed.  Anxiety: We discussed the  association of  insomnia and anxiety and recommended to follow-up with her primary care provider for optimizing the management of anxiety.      Nonrestorative sleep, fatigue, possible MERLY: she sleeps alone and hence there isn't anyone to report snoring or observed apneas during sleep. However, she reports symptoms of nonrestorative sleep, daily morning headaches and dry mouth. Possible MERLY based on  these symptoms including nasal and oropharyngeal anatomy and positive family history for MERLY. Recommend obtaining supervised polysomnography to evaluate for possible sleep apnea.   STOP BANG score is 1.   Recommend in-lab sleep study as she is low risk for MERLY.  Diagnosis and treatment for MERLY have been discussed. Complications of untreated MERLY have also been discussed.    Patient is a life long non-smoker and has been encouraged to continue to not smoke.    Encouraged  healthy diet, and exercise.    Patient was strongly advised to avoid driving, operating any heavy machinery or other hazardous situations while drowsy or sleepy.  Patient was counseled on the importance of driving while alert, to pull over if drowsy, or nap before getting into the vehicle if sleepy.        Plan is to follow up via virtual visit in 1 week after PSG is completed to review results and discuss plan of care.        CC: Baldemar Mercado    The above note was dictated using voice recognition software. Although reviewed after completion, some word and grammatical error may remain . Please contact the author for any clarifications.    \"I spent a total of 60 minutes face to face with Pita Tee during today's office visit. Over 50% of this time was spent counseling the patient and  coordinating care regarding insomnia, sleep hygiene, delayed " "sleep phase, stimulus control, CBT I, anxiety, sleep apnea, polysomnography.\"       Any Armas MD   of Medicine,  Division of Pulmonary/Sleep Medicine  [facility].                "

## 2019-05-06 NOTE — PATIENT INSTRUCTIONS
[facility]      Treating Insomnia  Good sleeping habits are a key part of treatment. If needed, some medications may help you sleep better at first. Making healthy lifestyle changes and learning to relax can improve your sleep. Treating insomnia takes commitment, but trust that your efforts will pay off. Talk to your doctor before taking any medication.    Healthy Lifestyle  Your lifestyle affects your health and your sleep. Here are some healthy habits:    Keep a regular sleep schedule. Go to bed and get up at the same time each day.    Exercise regularly. It may help you reduce stress. Avoid strenuous exercise for two to four hours before bedtime.    Avoid or limit naps.    Use your bed only for sleep and sex.    Don t spend too much time in bed trying to fall asleep. If you can t fall asleep, get up and do something until you become tired and drowsy.    Avoid or limit caffeine and nicotine. They can keep you awake at night. Also avoid alcohol. It may help you fall asleep at first, but your sleep will not be restful.    Before Bedtime  To sleep better every night, try these tips:    Have a bedtime routine to let your body and mind know when it s time to sleep.    Going to bed should be relaxing so try to do only relaxing things around bedtime. Sleep will come sooner.    If your worries don t let you sleep, write them down in a diary. Then close it, and go to bed.    Make sure the room is not too hot or too cold. If it s not dark enough, an eye mask can help. If it s noisy, try using earplugs.    Learn to Relax  Stress, anxiety, and body tension may keep you awake at night. To unwind before bedtime, try reading a book, meditation, or yoga. Also, try the following:    Deep breathing. Sit or lie back in a chair. Take a slow, deep breath. Hold it for 5 counts. Then breathe out slowly through your mouth. Keep doing this until you feel relaxed.    Imagery. Think of the last fun trip you took. In your  mind, walk through the trip from start to finish. Put as much detail into the memory as you can remember. It will help you relax.    Cognitive Behavioral Treatment (CBT)  CBT is the most effective treatment for long-term insomnia. It tries to address the underlying causes of your sleep problems, including your habits and how you think about sleep.      Individual Therapy   James Campos    SLEEP PSYCHOLOGIST    Online Programs     www.[employer] (pronounced shut eye). There is a fee for this program. Enter the code  [place]  if you decide to enroll in this program.      www.sleepIO.com (pronounced sleep ee oh). There is a fee for this program. Enter the code  [place]  if you decide to enroll in this program.     Suggested Resources  Insomnia Treatment Books     Overcoming Insomnia by Joseph Romero and Filomena Valderrama (2008)    No More Sleepless Nights by Goldy Prince and Manda Valentine (1996)    Say Prema to Insomnia by Candelario Barajas (2009)    The Insomnia Workbook by Coco Turner and Amador Matthew (2009)    The Insomnia Answer by Mason Rodriguez and Ben Goldstein (2006)      Stress Management and Relaxation Books    The Relaxation and Stress Reduction Workbook by Emily Black, Shyla Del Toro and Henry Villegas (2008)    Stress Management Workbook: Techniques and Self-Assessment Procedures by Rebecca Chaparro and Bandar Kathleen (1997)    A Mindfulness-Based Stress Reduction Workbook by Joshua Baumann and Aleena Pearson (2010)    The Complete Stress Management Workbook by Chris Nolasco, Matheus Fall and Lobo Reynolds (1996)    Assert Yourself by Nidhi Alcala and Randell Alcala (1977)    Relaxation Resources for Computer Download   These websites offer resources to help you relax. This list is for information only. [employer] is not responsible for the quality of services or the actions of any person or organization.  Progressive Muscle Relaxation (PMR):      http://www.[employer]progressive-muscle-relaxation-exercise.html     http://studentsupport.[facility]/counseling/resources/self-help/relaxation-and-stress-management/   Deep Breathing Exercises:    http://www.[employer]breathing-awareness.html     Meditation:     wwwfitogram    www.[employer]meditation-site.Ufree You may have to pay for some of these resources.    Guided Imagery:    http://www.[employer]guided-imagery-scripts.html     http://[employer]/library/lqekmpiiav-hridsh-auzwlfy/     Counseling / Behavioral Health  [facility]  Visit www.[facility].org or call [phone] to find a clinic close to you.      This is not a prescription and these resources are optional. You must pay for any costs when using these resources. Please ask your insurance carrier if you can be reimbursed for these resources. If so, you are responsible for sending the needed details to your insurance carrier. These resources may also be tax deductible as medical expenses. Check with your .     These programs and publications are not affiliated in any way with [employer].          How does cognitive behavioral therapy for insomnia work?  The cognitive side of cognitive behavioral therapy for insomnia teaches you to recognize and change beliefs that affect your ability to sleep. The behavioral part of cognitive behavioral therapy for insomnia helps you develop good sleep habits and avoid behaviors that keep you from sleeping well.   Cognitive behavioral therapy for insomnia contains one or more of the following elements:   Sleep education. To make effective changes, it's important to understand the basics of sleep -- for example, understanding sleep cycles and learning how beliefs, behaviors and outside factors can affect your sleep.   Cognitive control and psychotherapy. This type of therapy helps you control or eliminate negative  thoughts and worries that keep you awake. It may also involve eliminating false or worrisome beliefs about sleep, such as the idea that a single restless night will make you sick.   Sleep restriction. Lying in bed when you're awake can become a habit that leads to poor sleep. Limiting the amount of time you spend in bed can make you sleepier when you do go to bed. That way you're more likely to fall asleep and stay asleep.   Remaining passively awake. This involves avoiding any effort to fall asleep. Paradoxically, worrying that you can't sleep can actually keep you awake. Letting go of this worry can help you relax and make it easier to fall asleep.   Stimulus control therapy. This method helps remove factors that condition the mind to resist sleep. For example, you might be coached to use the bed only for sleep and sex, and to leave the bedroom if you can't go to sleep within 15 minutes.   Sleep hygiene. This method of therapy involves changing basic lifestyle habits that influence sleep, such as smoking or drinking too much caffeine late in the day, drinking too much alcohol, or not getting regular exercise. You may be told to avoid napping and taught to maintain a consistent sleep schedule. It also includes tips that help you sleep better, such as ways to wind down an hour or two before bedtime.   Relaxation training. This method helps you calm your mind and body. Approaches include meditation, hypnosis and muscle relaxation.   Biofeedback. This method allows you to observe biological signs such as heart rate and muscle tension. Your sleep specialist may have you take a biofeedback device home to record your daily patterns. This information can help identify patterns that affect sleep.   Sleep diary. To understand how to best treat your insomnia, your sleep therapist may have you keep a detailed sleep diary for one to two weeks. In the diary, you'll write down when you go to bed, when you get up, how much time  "you spend in bed unable to sleep, total sleep time and other details about your sleep patterns.   The most effective treatment approach may combine several of these methods.   Cognitive behavioral therapy vs. pills  Some newer sleeping medications have been approved for long-term use. But they may not be the best long-term insomnia treatment.   Sleep medications can be a very effective short-term treatment -- for example, they can provide immediate relief during a period of high stress or grief.   Cognitive behavioral therapy for insomnia may be a good treatment choice if you have long-term sleep problems. You may want to try it if you're worried about becoming dependent on sleep medications, if medications aren't effective or if they cause bothersome side effects.   Unlike pills, cognitive behavioral therapy for insomnia addresses the underlying causes of insomnia rather than just relieving symptoms. But it takes time -- and effort -- to make it work. In some cases, a combination of sleep medication and cognitive behavioral therapy for insomnia may be the best approach.      -I                  MY TREATMENT INFORMATION FOR SLEEP APNEA-  Pita Tee    DOCTOR : [facility]  SLEEP CENTER :      MY CONTACT NUMBER:     Am I having a sleep study at a sleep center?  Make sure you have an appointment for the study before you leave!    Am I having a home sleep study?  Watch this video:  https://www.youTraveDocube.com/watch?v=CteI_GhyP9g&list=[email or web address]  Please verify your insurance coverage with your insurance carrier    Frequently asked questions:  1. What is Obstructive Sleep Apnea (MERLY)? MERLY is the most common type of sleep apnea. Apnea means, \"without breath.\"  Apnea is most often caused by narrowing or collapse of the upper airway as muscles relax during sleep.   Almost everyone has occasional apneas. Most people with sleep apnea have had brief interruptions at night " frequently for many years.  The severity of sleep apnea is related to how frequent and severe the events are.   2. What are the consequences of MERLY? Symptoms include: feeling sleepy during the day, snoring loudly, gasping or stopping of breathing, trouble sleeping, and occasionally morning headaches or heartburn at night.  Sleepiness can be serious and even increase the risk of falling asleep while driving. Other health consequences may include development of high blood pressure and other cardiovascular disease in persons who are susceptible. Untreated MERLY  can contribute to heart disease, stroke and diabetes.   3. What are the treatment options? In most situations, sleep apnea is a lifelong disease that must be managed with daily therapy. Medications are not effective for sleep apnea and surgery is generally not considered until other therapies have been tried. Your treatment is your choice . Continuous Positive Airway (CPAP) works right away and is the therapy that is effective in nearly everyone. An oral device to hold your jaw forward is usually the next most reliable option. Other options include postioning devices (to keep you off your back), weight loss, and surgery including a tongue pacing device. There is more detail about some of these options below.    Important tips for using CPAP and similar devices   Know your equipment:  CPAP is continuous positive airway pressure that prevents obstructive sleep apnea by keeping the throat from collapsing while you are sleeping. In most cases, the device is  smart  and can slowly self-adjusts if your throat collapses and keeps a record every day of how well you are treated-this information is available to you and your care team.  BPAP is bilevel positive airway pressure that keeps your throat open and also assists each breath with a pressure boost to maintain adequate breathing.  Special kinds of BPAP are used in patients who have inadequate breathing from lung or  heart disease. In most cases, the device is  smart  and can slowly self-adjusts to assist breathing. Like CPAP, the device keeps a record of how well you are treated.  Your mask is your connection to the device. You get to choose what feels most comfortable and the staff will help to make sure if fits. Here: are some examples of the different masks that are available:       Key points to remember on your journey with sleep apnea:  1. Sleep study.  PAP devices often need to be adjusted during a sleep study to show that they are effective and adjusted right.  2. Good tips to remember: Try wearing just the mask during a quiet time during the day so your body adapts to wearing it. A humidifier is recommended for comfort in most cases to prevent drying of your nose and throat. Allergy medication from your provider may help you if you are having nasal congestion.  3. Getting settled-in. It takes more than one night for most of us to get used to wearing a mask. Try wearing just the mask during a quiet time during the day so your body adapts to wearing it. A humidifier is recommended for comfort in most cases. Our team will work with you carefully on the first day and will be in contact within 4 days and again at 2 and 4 weeks for advice and remote device adjustments. Your therapy is evaluated by the device each day.   4. Use it every night. The more you are able to sleep naturally for 7-8 hours, the more likely you will have good sleep and to prevent health risks or symptoms from sleep apnea. Even if you use it 4 hours it helps. Occasionally all of us are unable to use a medical therapy, in sleep apnea, it is not dangerous to miss one night.   5. Communicate. Call our skilled team on the number provided on the first day if your visit for problems that make it difficult to wear the device. Over 2 out of 3 patients can learn to wear the device long-term with help from our team. Remember to call our team or your sleep  providers if you are unable to wear the device as we may have other solutions for those who cannot adapt to mask CPAP therapy. It is recommended that you sleep your sleep provider within the first 3 months and yearly after that if you are not having problems.   6. Use it for your health. We encourage use of CPAP masks during daytime quiet periods to allow your face and brain to adapt to the sensation of CPAP so that it will be a more natural sensation to awaken to at night or during naps. This can be very useful during the first few weeks or months of adapting to CPAP though it does not help medically to wear CPAP during wakefulness and  should not be used as a strategy just to meet guidelines.  7. Take care of your equipment. Make sure you clean your mask and tubing using directions every day and that your filter and mask are replaced as recommended or if they are not working.     BESIDES CPAP, WHAT OTHER THERAPIES ARE THERE?    Positioning Device  Positioning devices are generally used when sleep apnea is mild and only occurs on your back.This example shows a pillow that straps around the waist. It may be appropriate for those whose sleep study shows milder sleep apnea that occurs primarily when lying flat on one's back. Preliminary studies have shown benefit but effectiveness at home may need to be verified by a home sleep test. These devices are generally not covered by medical insurance.  Examples of devices that maintain sleeping on the back to prevent snoring and mild sleep apnea.    Belt type body positioner  Http://Moxtra.6Rooms/    Electronic reminder  Http://nightshifttherapy.com/  Http://www.3point5.com.com.au/      Oral Appliance  What is oral appliance therapy?  An oral appliance device fits on your teeth at night like a retainer used after having braces. The device is made by a specialized dentist and requires several visits over 1-2 months before a manufactured device is made to fit your teeth and is  adjusted to prevent your sleep apnea. Once an oral device is working properly, snoring should be improved. A home sleep test may be recommended at that time if to determine whether the sleep apnea is adequately treated.       Some things to remember:  -Oral devices are often, but not always, covered by your medical insurance. Be sure to check with your insurance provider.   -If you are referred for oral therapy, you will be given a list of specialized dentists to consider or you may choose to visit the Web site of the American Academy of Dental Sleep Medicine  -Oral devices are less likely to work if you have severe sleep apnea or are extremely overweight.     More detailed information  An oral appliance is a small acrylic device that fits over the upper and lower teeth  (similar to a retainer or a mouth guard). This device slightly moves jaw forward, which moves the base of the tongue forward, opens the airway, improves breathing for effective treat snoring and obstructive sleep apnea in perhaps 7 out of 10 people .  The best working devices are custom-made by a dental device  after a mold is made of the teeth 1, 2, 3.  When is an oral appliance indicated?  Oral appliance therapy is recommended as a first-line treatment for patients with primary snoring, mild sleep apnea, and for patients with moderate sleep apnea who prefer appliance therapy to use of CPAP4, 5. Severity of sleep apnea is determined by sleep testing and is based on the number of respiratory events per hour of sleep.   How successful is oral appliance therapy?  The success rate of oral appliance therapy in patients with mild sleep apnea is 75-80% while in patients with moderate sleep apnea it is 50-70%. The chance of success in patients with severe sleep apnea is 40-50%. The research also shows that oral appliances have a beneficial effect on the cardiovascular health of MERLY patients at the same magnitude as CPAP therapy7.  Oral  appliances should be a second-line treatment in cases of severe sleep apnea, but if not completely successful then a combination therapy utilizing CPAP plus oral appliance therapy may be effective. Oral appliances tend to be effective in a broad range of patients although studies show that the patients who have the highest success are females, younger patients, those with milder disease, and less severe obesity. 3, 6.   Finding a dentist that practices dental sleep medicine  Specific training is available through the American Academy of Dental Sleep Medicine for dentists interested in working in the field of sleep. To find a dentist who is educated in the field of sleep and the use of oral appliances, near you, visit the Web site of the American Academy of Dental Sleep Medicine.    References  1. Donnell et al. Objectively measured vs self-reported compliance during oral appliance therapy for sleep-disordered breathing. Chest 2013; 144(5): 1776-0383.  2. Rebecca et al. Objective measurement of compliance during oral appliance therapy for sleep-disordered breathing. Thorax 2013; 68(1): 91-96.  3. Cristobal et al. Mandibular advancement devices in 620 men and women with MERLY and snoring: tolerability and predictors of treatment success. Chest 2004; 125: 1990-3430.  4. David, et al. Oral appliances for snoring and MERLY: a review. Sleep 2006; 29: 244-262.  5. James et al. Oral appliance treatment for MERLY: an update. J Clin Sleep Med 2014; 10(2): 215-227.  6. Thang et al. Predictors of OSAH treatment outcome. J Dent Res 2007; 86: 0351-7238.      Weight Loss:    Weight loss is a long-term strategy that may improve sleep apnea in some patients.    Weight management is a personal decision and the decision should be based on your interest and the potential benefits.  If you are interested in exploring weight loss strategies, the following discussion covers the impact on weight loss on sleep apnea and the  approaches that may be successful.    Being overweight does not necessarily mean you will have health consequences.  Those who have BMI over 35 or over 27 with existing medical conditions carries greater risk.   Weight loss decreases severity of sleep apnea in most people with obesity. For those with mild obesity who have developed snoring with weight gain, even 15-30 pound weight loss can improve and occasionally eliminate sleep apnea.  Structured and life-long dietary and health habits are necessary to lose weight and keep healthier weight levels.     Though there may be significant health benefits from weight loss, long-term weight loss is very difficult to achieve- studies show success with dietary management in less than 10% of people. In addition, substantial weight loss may require years of dietary control and may be difficult if patients have severe obesity. In these cases, surgical management may be considered.  Finally, older individuals who have tolerated obesity without health complications may be less likely to benefit from weight loss strategies.        Your BMI is Body mass index is 18.03 kg/m .  Weight management is a personal decision.  If you are interested in exploring weight loss strategies, the following discussion covers the approaches that may be successful. Body mass index (BMI) is one way to tell whether you are at a healthy weight, overweight, or obese. It measures your weight in relation to your height.  A BMI of 18.5 to 24.9 is in the healthy range. A person with a BMI of 25 to 29.9 is considered overweight, and someone with a BMI of 30 or greater is considered obese. More than two-thirds of American adults are considered overweight or obese.  Being overweight or obese increases the risk for further weight gain. Excess weight may lead to heart disease and diabetes.  Creating and following plans for healthy eating and physical activity may help you improve your health.  Weight control is  part of healthy lifestyle and includes exercise, emotional health, and healthy eating habits. Careful eating habits lifelong are the mainstay of weight control. Though there are significant health benefits from weight loss, long-term weight loss with diet alone may be very difficult to achieve- studies show long-term success with dietary management in less than 10% of people. Attaining a healthy weight may be especially difficult to achieve in those with severe obesity. In some cases, medications, devices and surgical management might be considered.  What can you do?  If you are overweight or obese and are interested in methods for weight loss, you should discuss this with your provider.     Consider reducing daily calorie intake by 500 calories.     Keep a food journal.     Avoiding skipping meals, consider cutting portions instead.    Diet combined with exercise helps maintain muscle while optimizing fat loss. Strength training is particularly important for building and maintaining muscle mass. Exercise helps reduce stress, increase energy, and improves fitness. Increasing exercise without diet control, however, may not burn enough calories to loose weight.       Start walking three days a week 10-20 minutes at a time    Work towards walking thirty minutes five days a week     Eventually, increase the speed of your walking for 1-2 minutes at time    In addition, we recommend that you review healthy lifestyles and methods for weight loss available through the National Institutes of Health patient information sites:  http://win.niddk.nih.gov/publications/index.htm    And look into health and wellness programs that may be available through your health insurance provider, employer, local community center, or gianni club.          Surgery:    Surgery for obstructive sleep apnea is considered generally only when other therapies fail to work. Surgery may be discussed with you if you are having a difficult time tolerating  CPAP and or when there is an abnormal structure that requires surgical correction.  Nose and throat surgeries often enlarge the airway to prevent collapse.  Most of these surgeries create pain for 1-2 weeks and up to half of the most common surgeries are not effective throughout life.  You should carefully discuss the benefits and drawbacks to surgery with your sleep provider and surgeon to determine if it is the best solution for you.   More information  Surgery for MERLY is directed at areas that are responsible for narrowing or complete obstruction of the airway during sleep.  There are a wide range of procedures available to enlarge and/or stabilize the airway to prevent blockage of breathing in the three major areas where it can occur: the palate, tongue, and nasal regions.  Successful surgical treatment depends on the accurate identification of the factors responsible for obstructive sleep apnea in each person.  A personalized approach is required because there is no single treatment that works well for everyone.  Because of anatomic variation, consultation with an examination by a sleep surgeon is a critical first step in determining what surgical options are best for each patient.  In some cases, examination during sedation may be recommended in order to guide the selection of procedures.  Patients will be counseled about risks and benefits as well as the typical recovery course after surgery. Surgery is typically not a cure for a person s MERLY.  However, surgery will often significantly improve one s MERLY severity (termed  success rate ).  Even in the absence of a cure, surgery will decrease the cardiovascular risk associated with OSA7; improve overall quality of life8 (sleepiness, functionality, sleep quality, etc).      Palate Procedures:  Patients with MERLY often have narrowing of their airway in the region of their tonsils and uvula.  The goals of palate procedures are to widen the airway in this region as  well as to help the tissues resist collapse.  Modern palate procedure techniques focus on tissue conservation and soft tissue rearrangement, rather than tissue removal.  Often the uvula is preserved in this procedure. Residual sleep apnea is common in patient after pharyngoplasty with an average reduction in sleep apnea events of 33%2.      Tongue Procedures:  ExamWhile patients are awake, the muscles that surround the throat are active and keep this region open for breathing. These muscles relax during sleep, allowing the tongue and other structures to collapse and block breathing.  There are several different tongue procedures available.  Selection of a tongue base procedure depends on characteristics seen on physical exam.  Generally, procedures are aimed at removing bulky tissues in this area or preventing the back of the tongue from falling back during sleep.  Success rates for tongue surgery range from 50-62%3.    Hypoglossal Nerve Stimulation:  Hypoglossal nerve stimulation has recently received approval from the [place] Food and Drug Administration for the treatment of obstructive sleep apnea.  This is based on research showing that the system was safe and effective in treating sleep apnea6.  Results showed that the median AHI score decreased 68%, from 29.3 to 9.0. This therapy uses an implant system that senses breathing patterns and delivers mild stimulation to airway muscles, which keeps the airway open during sleep.  The system consists of three fully implanted components: a small generator (similar in size to a pacemaker), a breathing sensor, and a stimulation lead.  Using a small handheld remote, a patient turns the therapy on before bed and off upon awakening.    Candidates for this device must be greater than 22 years of age, have moderate to severe MERLY (AHI between 20-65), BMI less than 32, have tried CPAP/oral appliance without success, and have appropriate upper airway anatomy (determined by  a sleep endoscopy performed by Dr. Araya).    Hypoglossal Nerve Stimulation Pathway:    The sleep surgeon s office will work with the patient through the insurance prior-authorization process (including communications and appeals).    Nasal Procedures:  Nasal obstruction can interfere with nasal breathing during the day and night.  Studies have shown that relief of nasal obstruction can improve the ability of some patients to tolerate positive airway pressure therapy for obstructive sleep apnea1.  Treatment options include medications such as nasal saline, topical corticosteroid and antihistamine sprays, and oral medications such as antihistamines or decongestants. Non-surgical treatments can include external nasal dilators for selected patients. If these are not successful by themselves, surgery can improve the nasal airway either alone or in combination with these other options.      Combination Procedures:  Combination of surgical procedures and other treatments may be recommended, particularly if patients have more than one area of narrowing or persistent positional disease.  The success rate of combination surgery ranges from 66-80%2,3.    References  1. Salas SALAZAR. The Role of the Nose in Snoring and Obstructive Sleep Apnoea: An Update.  Eur Arch Otorhinolaryngol. 2011; 268: 1365-73.  2.  Lluvia SM; Desire JA; Sergio JR; Pallanch JF; Nina MB; Ingris SG; Dina PARR. Surgical modifications of the upper airway for obstructive sleep apnea in adults: a systematic review and meta-analysis. SLEEP 2010;33(10):0339-7556. Joshua LUNDBERG. Hypopharyngeal surgery in obstructive sleep apnea: an evidence-based medicine review.  Arch Otolaryngol Head Neck Surg. 2006 Feb;132(2):206-13.  3. Laureano YH1, Abhi Y, Phil STEFANY. The efficacy of anatomically based multilevel surgery for obstructive sleep apnea. Otolaryngol Head Neck Surg. 2003 Oct;129(4):327-35.  4. Joshua LUNDBERG, Goldberg A. Hypopharyngeal Surgery in Obstructive Sleep Apnea:  An Evidence-Based Medicine Review. Arch Otolaryngol Head Neck Surg. 2006 Feb;132(2):206-13.  5. Jamee PJ et al. Upper-Airway Stimulation for Obstructive Sleep Apnea.  N Engl J Med. 2014 Jan 9;370(2):139-49.  6. Austin Y et al. Increased Incidence of Cardiovascular Disease in Middle-aged Men with Obstructive Sleep Apnea. Am J Respir Crit Care Med; 2002 166: 159-165  7. Estrada MO et al. Studying Life Effects and Effectiveness of Palatopharyngoplasty (SLEEP) study: Subjective Outcomes of Isolated Uvulopalatopharyngoplasty. Otolaryngol Head Neck Surg. 2011; 144: 623-631.    Follow up with your primary care provider for optimizing management of anxiety and the chronic pain

## 2019-08-12 ASSESSMENT — ENCOUNTER SYMPTOMS
CHILLS: 0
WEIGHT LOSS: 0
POLYDIPSIA: 0
POLYPHAGIA: 0
DECREASED APPETITE: 0
WEIGHT GAIN: 0
FATIGUE: 1
ALTERED TEMPERATURE REGULATION: 0
HALLUCINATIONS: 0
NIGHT SWEATS: 0
INCREASED ENERGY: 0
FEVER: 0

## 2019-08-19 ENCOUNTER — OFFICE VISIT (OUTPATIENT)
Dept: FAMILY MEDICINE | Facility: CLINIC | Age: 29
End: 2019-08-19
Payer: COMMERCIAL

## 2019-08-19 VITALS
OXYGEN SATURATION: 99 % | BODY MASS INDEX: 17.7 KG/M2 | TEMPERATURE: 98.4 F | DIASTOLIC BLOOD PRESSURE: 65 MMHG | WEIGHT: 96.2 LBS | SYSTOLIC BLOOD PRESSURE: 98 MMHG | HEIGHT: 62 IN | HEART RATE: 66 BPM | RESPIRATION RATE: 17 BRPM

## 2019-08-19 DIAGNOSIS — Z00.00 HEALTH CARE MAINTENANCE: ICD-10-CM

## 2019-08-19 DIAGNOSIS — M25.50 MULTIPLE JOINT PAIN: ICD-10-CM

## 2019-08-19 DIAGNOSIS — N83.00 CYST, OVARY, FOLLICULAR: ICD-10-CM

## 2019-08-19 DIAGNOSIS — Z00.00 ROUTINE GENERAL MEDICAL EXAMINATION AT A HEALTH CARE FACILITY: ICD-10-CM

## 2019-08-19 LAB
CHOLEST SERPL-MCNC: 196 MG/DL
HDLC SERPL-MCNC: 68 MG/DL
LDLC SERPL CALC-MCNC: 100 MG/DL
NONHDLC SERPL-MCNC: 128 MG/DL
TRIGL SERPL-MCNC: 141 MG/DL

## 2019-08-19 RX ORDER — DESOGESTREL AND ETHINYL ESTRADIOL 0.15-0.03
1 KIT ORAL DAILY
Qty: 84 TABLET | Refills: 3 | Status: SHIPPED | OUTPATIENT
Start: 2019-08-19 | End: 2019-08-26

## 2019-08-19 ASSESSMENT — MIFFLIN-ST. JEOR: SCORE: 1106.67

## 2019-08-19 ASSESSMENT — PAIN SCALES - GENERAL: PAINLEVEL: NO PAIN (0)

## 2019-08-19 NOTE — PROGRESS NOTES
[facility]   Baldemar Mercaod MD  08/19/2019      Chief Complaint:   Physical       History of Present Illness:   Pita Tee is a 29 year old female with a history of anxiety, IBS, and migraines who presents for an annual physical. She would like to discuss her current birth control pill. She is wondering if there is any hormone pill that has high enough estrogen to prevent ovarian cysts but that does not make her sick. She is currently on a pill where she has no cysts but will have nausea the first day of the pack. In the past, she was on a low dose estrogen pill but this did not prevent cysts so she had lots of pain and would need to go to the ER when there was a cyst.     Pita recently went to the sleep clinic because last visit complained of non-restorative sleep. They noted that her airway is narrow and she may have sleep apnea as a result but they would need to do a sleep study. However, her insurance does not cover this so she cannot get one done. She is still acting out her dreams and will wake up sitting up and in weird positions.        Review of Systems:   Pertinent items are noted in HPI or as in patient entered ROS below, remainder of complete ROS is negative.   Answers for HPI/ROS submitted by the patient on 8/12/2019   General Symptoms: Yes  Skin Symptoms: No  HENT Symptoms: No  EYE SYMPTOMS: No  HEART SYMPTOMS: No  LUNG SYMPTOMS: No  INTESTINAL SYMPTOMS: No  URINARY SYMPTOMS: No  GYNECOLOGIC SYMPTOMS: No  BREAST SYMPTOMS: No  SKELETAL SYMPTOMS: No  BLOOD SYMPTOMS: No  NERVOUS SYSTEM SYMPTOMS: No  MENTAL HEALTH SYMPTOMS: No  Fever: No  Loss of appetite: No  Weight loss: No  Weight gain: No  Fatigue: Yes  Night sweats: No  Chills: No  Increased stress: No  Excessive hunger: No  Excessive thirst: No  Feeling hot or cold when others believe the temperature is normal: No  Loss of height: No  Post-operative complications: No  Surgical site pain: No  Hallucinations:  "No  Change in or Loss of Energy: No  Hyperactivity: No  Confusion: No    Active Medications:     Current Outpatient Medications:      desogestrel-ethinyl estradiol (APRI) 0.15-30 MG-MCG tablet, Take 1 tablet by mouth daily, Disp: 84 tablet, Rfl: 3     fexofenadine (ALLEGRA) 180 MG tablet, Take 180 mg by mouth as needed for allergies, Disp: , Rfl:      Ibuprofen (ADVIL PO), Take by mouth as needed for moderate pain, Disp: , Rfl:       Allergies:   Macrobid [nitrofurantoin]      Past Medical History:  Dysmenorrhea  Generalized anxiety disorder  Irritable bowel syndrome  Migraines  Nephrolithiasis      Past Surgical History:  Removal of kidney stone    Family History:   Hypertension - maternal grandfather  CAD - maternal grandfather  Diabetes - paternal grandfather   Arthritis - sister  Anxiety - mother  Migraines - mother  Lewy body - maternal grandmother   Cancer, ovarian and breast - other     Social History:   Presents alone. Currently a . She stays at home for work. Trying to get regular exercise and needs to improve diet. Mother and father are healthy and living. One sister living.   Tobacco Use: none  Alcohol Use: socially  Drug Use: none  PCP: Baldemar Mercado      Physical Exam:   BP 98/65 (BP Location: Right arm, Patient Position: Sitting, Cuff Size: Adult Regular)   Pulse 66   Temp 98.4  F (36.9  C) (Oral)   Resp 17   Ht 1.562 m (5' 1.5\")   Wt 43.6 kg (96 lb 3.2 oz)   LMP 07/26/2019 (Exact Date)   SpO2 99%   Breastfeeding? No   BMI 17.88 kg/m     Constitutional: Alert. In no distress.  Head: Normocephalic. No masses, lesions, tenderness or abnormalities.  ENT: No neck nodes or sinus tenderness.  Cardiovascular: RRR. No murmurs, clicks, gallops, or rub.  Respiratory: Clear to auscultation bilaterally, no wheezes or crackles.  Gastrointestinal: Abdomen soft. Non-tender. BS normal. No masses or organomegaly.  Musculoskeletal: Extremities normal. No gross deformities noted. Normal muscle " tone.  Skin: No suspicious lesions. No rashes.  Neurologic: Gait normal. Reflexes normal and symmetric. Sensation grossly WNL.  Psychiatric: Mentation appears normal. Normal affect.   Hematologic/Lymphatic/Immunologic: Normal cervical lymph nodes.   Pelvic exam: normal vagina and vulva, normal cervix without lesions or tenderness, uterus normal size anteverted, adenxa normal in size without tenderness, pap smear done, exam chaperoned by nurse.  Breasts: normal without suspicious masses, skin changes or axillary nodes, symmetric fibrous changes in both upper outer quadrants, self exam is taught and encouraged.     Assessment and Plan:  Cyst, ovary, follicular & Routine general medical examination at a health care facility  - desogestrel-ethinyl estradiol (APRI) 0.15-30 MG-MCG tablet  Dispense: 84 tablet; Refill: 3    Health care maintenance  - Pap imaged thin layer screen reflex to HPV if ASCUS - recommend age 25 - 29  - Lipid panel reflex to direct LDL Fasting  - desogestrel-ethinyl estradiol (APRI) 0.15-30 MG-MCG tablet  Dispense: 84 tablet; Refill: 3    Birth control  She becomes nauseated when starting her birth control each month. I suggested to her Gynecology would have more experience selecting a pill that could be more successful for her. She does find her current birth control useful at cyst prevention.  - OB/GYN REFERRAL     I sent a message to her sleep care provider to see if they have a way to appeal to her sleep test denial. I also informed them she is having parasomnias.     Follow-up: No follow-ups on file.         Scribe Disclosure:  I, Cari Zepeda, am serving as a scribe to document services personally performed by Baldemar Mercado MD at this visit, based upon the provider's statements to me. All documentation has been reviewed by the aforementioned provider prior to being entered into the official medical record.     Portions of this medical record were completed by a scribe. UPON MY REVIEW AND  AUTHENTICATION BY ELECTRONIC SIGNATURE, this confirms (a) I performed the applicable clinical services, and (b) the record is accurate.   Baldemar Mercado MD

## 2019-08-19 NOTE — NURSING NOTE
Chief Complaint   Patient presents with     Physical       Tarik Davis CMA (AAMA) at 10:02 AM on 8/19/2019

## 2019-08-21 LAB
COPATH REPORT: NORMAL
PAP: NORMAL

## 2019-08-22 ASSESSMENT — ANXIETY QUESTIONNAIRES
5. BEING SO RESTLESS THAT IT IS HARD TO SIT STILL: NOT AT ALL
2. NOT BEING ABLE TO STOP OR CONTROL WORRYING: NOT AT ALL
4. TROUBLE RELAXING: NOT AT ALL
6. BECOMING EASILY ANNOYED OR IRRITABLE: NOT AT ALL
7. FEELING AFRAID AS IF SOMETHING AWFUL MIGHT HAPPEN: NOT AT ALL
7. FEELING AFRAID AS IF SOMETHING AWFUL MIGHT HAPPEN: NOT AT ALL
3. WORRYING TOO MUCH ABOUT DIFFERENT THINGS: NOT AT ALL
GAD7 TOTAL SCORE: 0
GAD7 TOTAL SCORE: 0
1. FEELING NERVOUS, ANXIOUS, OR ON EDGE: NOT AT ALL

## 2019-08-23 ASSESSMENT — ANXIETY QUESTIONNAIRES: GAD7 TOTAL SCORE: 0

## 2019-08-26 ENCOUNTER — OFFICE VISIT (OUTPATIENT)
Dept: OBGYN | Facility: CLINIC | Age: 29
End: 2019-08-26
Attending: FAMILY MEDICINE
Payer: COMMERCIAL

## 2019-08-26 VITALS
HEART RATE: 66 BPM | BODY MASS INDEX: 18.35 KG/M2 | SYSTOLIC BLOOD PRESSURE: 111 MMHG | DIASTOLIC BLOOD PRESSURE: 75 MMHG | WEIGHT: 97.2 LBS | HEIGHT: 61 IN

## 2019-08-26 DIAGNOSIS — Z30.09 BIRTH CONTROL COUNSELING: Primary | ICD-10-CM

## 2019-08-26 DIAGNOSIS — Z00.00 ROUTINE GENERAL MEDICAL EXAMINATION AT A HEALTH CARE FACILITY: ICD-10-CM

## 2019-08-26 DIAGNOSIS — Z00.00 HEALTH CARE MAINTENANCE: ICD-10-CM

## 2019-08-26 DIAGNOSIS — N83.00 CYST, OVARY, FOLLICULAR: ICD-10-CM

## 2019-08-26 DIAGNOSIS — M25.50 MULTIPLE JOINT PAIN: ICD-10-CM

## 2019-08-26 RX ORDER — DESOGESTREL AND ETHINYL ESTRADIOL 0.15-0.03
1 KIT ORAL DAILY
Qty: 84 TABLET | Refills: 3 | Status: SHIPPED | OUTPATIENT
Start: 2019-08-26 | End: 2020-01-15 | Stop reason: ALTCHOICE

## 2019-08-26 ASSESSMENT — ANXIETY QUESTIONNAIRES
6. BECOMING EASILY ANNOYED OR IRRITABLE: MORE THAN HALF THE DAYS
1. FEELING NERVOUS, ANXIOUS, OR ON EDGE: SEVERAL DAYS
3. WORRYING TOO MUCH ABOUT DIFFERENT THINGS: SEVERAL DAYS
2. NOT BEING ABLE TO STOP OR CONTROL WORRYING: NEARLY EVERY DAY
7. FEELING AFRAID AS IF SOMETHING AWFUL MIGHT HAPPEN: NOT AT ALL

## 2019-08-26 ASSESSMENT — PATIENT HEALTH QUESTIONNAIRE - PHQ9
SUM OF ALL RESPONSES TO PHQ QUESTIONS 1-9: 4
5. POOR APPETITE OR OVEREATING: NOT AT ALL

## 2019-08-26 ASSESSMENT — MIFFLIN-ST. JEOR: SCORE: 1103.28

## 2019-08-26 ASSESSMENT — PAIN SCALES - GENERAL: PAINLEVEL: NO PAIN (0)

## 2019-08-26 NOTE — PROGRESS NOTES
"S: Pita is here to discuss her current birth control, Apri, which she has been on for over a year. She is wondering if there is any hormone pill that has high enough estrogen to prevent ovarian cysts but that does not make her nauseated on the first day of her active pills. She is currently on a pill where she has no cysts but will have nausea the first day of the pack. In the past, she was on a low dose estrogen pill but this did not prevent cysts. Her LMP was 7/26/19. She does take the placebo week every month. She doesn't have any other complaints about her birth control but would like to address these symptoms, if possible. While she does have a history of migraines, they are not with aura and she has no other risk factors for clotting that would contraindicate COCs.     Answers for HPI/ROS submitted by the patient on 8/22/2019   FABRICIO 7 TOTAL SCORE: 0  General Symptoms: No  Skin Symptoms: No  HENT Symptoms: No  EYE SYMPTOMS: No  HEART SYMPTOMS: No  LUNG SYMPTOMS: No  INTESTINAL SYMPTOMS: No  URINARY SYMPTOMS: No  GYNECOLOGIC SYMPTOMS: No  BREAST SYMPTOMS: No  SKELETAL SYMPTOMS: No  BLOOD SYMPTOMS: No  NERVOUS SYSTEM SYMPTOMS: No  MENTAL HEALTH SYMPTOMS: No    Current medications: APRI, Allegra, Advil    Allergies: Macrobid    PMH: Dysmenorrhea, FABRICIO, IBS, Migraines without aura, nephrolithiasis.    FMH: not significant for clotting disorders.  Mgrandmother-Lewy body, Cancer of ovaries and breast    Social history: Works at home as . No tobacco or drug use, occasional social drinking.       O: /75   Pulse 66   Ht 1.549 m (5' 1\")   Wt 44.1 kg (97 lb 3.2 oz)   LMP 07/26/2019   BMI 18.37 kg/m      Healthy woman of stated age, no apparent distress.     A/P: Nausea related to the estrogen component of her DEBI.    Discussed at length the following options:    1) Continuous cycling with current DEBI to avoid the estrogen withdrawal and restart.  2) Changing to another DEBI, would not recommend " lowering beyond 30 mcg of estrogen due to desired effect of decreasing follicular cysts.  3) Adding an anti-nausea medication for the day before and the day of her first active pill.     Pita would like to avoid taking another pill to remedy the side effects of her DEBI and is ok with not having a monthly menstruation. She will try continuous cycling of her current DEBI first and will then try another pill if that is not effective in managing her nausea.     I spent a total of 45 minutes face to face with Pita Tee during today's office Visit. Over 50% of this time was spent counseling the patient and/or coordinating care regarding management of her birth control.    Patricia Chiang CNM

## 2019-08-26 NOTE — LETTER
"8/26/2019       RE: Pita Tee  [address]  [address]  [facility] [address]     Dear Colleague,    Thank you for referring your patient, Pita Tee, to the [facility] at [facility]. Please see a copy of my visit note below.    S: Pita is here to discuss her current birth control, Apri, which she has been on for over a year. She is wondering if there is any hormone pill that has high enough estrogen to prevent ovarian cysts but that does not make her nauseated on the first day of her active pills. She is currently on a pill where she has no cysts but will have nausea the first day of the pack. In the past, she was on a low dose estrogen pill but this did not prevent cysts. Her LMP was 7/26/19. She does take the placebo week every month. She doesn't have any other complaints about her birth control but would like to address these symptoms, if possible. While she does have a history of migraines, they are not with aura and she has no other risk factors for clotting that would contraindicate COCs.     Answers for HPI/ROS submitted by the patient on 8/22/2019   FABRICIO 7 TOTAL SCORE: 0  General Symptoms: No  Skin Symptoms: No  HENT Symptoms: No  EYE SYMPTOMS: No  HEART SYMPTOMS: No  LUNG SYMPTOMS: No  INTESTINAL SYMPTOMS: No  URINARY SYMPTOMS: No  GYNECOLOGIC SYMPTOMS: No  BREAST SYMPTOMS: No  SKELETAL SYMPTOMS: No  BLOOD SYMPTOMS: No  NERVOUS SYSTEM SYMPTOMS: No  MENTAL HEALTH SYMPTOMS: No    Current medications: APRI, Allegra, Advil    Allergies: Macrobid    PMH: Dysmenorrhea, FABRICIO, IBS, Migraines without aura, nephrolithiasis.    FMH: not significant for clotting disorders.  Mgrandmother-Lewy body, Cancer of ovaries and breast    Social history: Works at home as . No tobacco or drug use, occasional social drinking.     O: /75   Pulse 66   Ht 1.549 m (5' 1\")   Wt 44.1 kg (97 lb 3.2 oz)   LMP 07/26/2019   BMI 18.37 " kg/m       Healthy woman of stated age, no apparent distress.     A/P: Nausea related to the estrogen component of her DEBI.    Discussed at length the following options:    1) Continuous cycling with current DEBI to avoid the estrogen withdrawal and restart.  2) Changing to another DEBI, would not recommend lowering beyond 30 mcg of estrogen due to desired effect of decreasing follicular cysts.  3) Adding an anti-nausea medication for the day before and the day of her first active pill.     Pita would like to avoid taking another pill to remedy the side effects of her DEBI and is ok with not having a monthly menstruation. She will try continuous cycling of her current DEBI first and will then try another pill if that is not effective in managing her nausea.     I spent a total of 45 minutes face to face with Pita Tee during today's office Visit. Over 50% of this time was spent counseling the patient and/or coordinating care regarding management of her birth control.    Patricia Chiang CNM

## 2019-12-19 NOTE — PROGRESS NOTES
SUBJECTIVE:   Pita Tee is a 29 yr old female, , who presents to clinic today due to spotting with COCs.    Pita has been taking her COCs continuously for the last 4 months in hopes this would help the nausea she was having during the first couple days of a new pill pack after taking the placebo pills. She had only a few days of light spotting within the first 3 months.  However, in month 4, she has now been spotting for almost 3 weeks straight. Denies missing any pills. Light in amount (uses 1-2 pads per day), never passing clots or soaking a pad within an hour. Accompanied by mild to moderate cramping. Reports feeling fatigued; denies lightheadedness, SOB, or weakness.  She denies signs/ symptoms of thyroid dysfunction, high stress, changes in weight, vaginal itching, irritation, or odorous discharge.   Taking the pills continuously did resolve the nausea she experienced with the start of a a new pill pack.     Pita started COCs to prevent ovarian cysts and manage menorrhagia which led to anemia.     Sexual History: Last sexually active 1 yr ago. Declines risk for STDs. Uses condoms when she has been sexually active. Last tested for STDs in 2016.      Last pap smear: 19 NIL    Past Medical History:   Diagnosis Date     Anemia     Due to heavy periods, resolved with hormonal birth control     Dysmenorrhea      Generalised anxiety disorder     Dx age 17; on zoloft for 2 years     IBS (irritable bowel syndrome)     constipation predominant     Migraines      Nephrolithiasis    Hx of migraines, no aura    Past Surgical History:   Procedure Laterality Date     C REMOVAL OF KIDNEY STONE       Family History   Problem Relation Age of Onset     Hypertension Maternal Grandfather         Heart attack followed by a-alexandra/sandeep     C.A.PRISCILA Maternal Grandfather         MI     Diabetes Paternal Grandfather      Hyperlipidemia Paternal Grandfather         Over 300     Arthritis Sister      Anxiety  "Disorder Mother      Neurologic Disorder Mother         Migraines     Neurologic Disorder Maternal Grandmother         Possible Lewy body     Cerebrovascular Disease Maternal Grandmother         Multiple mini-strokes     Breast Cancer Other      Cancer Other         Ovarian     Breast Cancer Other      Asthma Sister         In childhood       OBJECTIVE:  /75 (BP Location: Left arm, Patient Position: Chair)   Pulse 67   Ht 1.549 m (5' 1\")   Wt 45.8 kg (101 lb)   BMI 19.08 kg/m    General: pleasant female in no acute distress  Psych: normal mentation, well oriented  Pelvic Exam:  Vulva: No external lesions, normal hair distribution, no adenopathy  Vagina: Moist, pink, scant amount of blood present, well rugated, no lesions  Cervix: nulliparous, smooth, pink, no visible lesions  Uterus: Normal size, anteverted, non-tender, mobile  Ovaries: No mass, non-tender, mobile    Hemoglobin   Date Value Ref Range Status   12/20/2019 11.9 11.7 - 15.7 g/dL Final       ASSESSMENT & PLAN:  (N92.0) Menorrhagia with regular cycle  (primary encounter diagnosis)  (Z87.42) Hx of Ovarian Cyst  (Z30.41) Surveillance of previously prescribed contraceptive pill    Hemoglobin POCT within the normal range today; no evidence of acute blood loss anemia.  Recommended STD testing, patient declined.   UPT not done as patient is not sexually active. No signs/ symptoms of vaginal infection today.     Recommended switching COCs to one that has a different hormonal balance and includes iron. Recommended continuing current pill pack as she is almost at the end of the active pill weeks and then allowing body to have a withdrawal bleed. Start new pill pack after completing placebo pills. May continue taking them continuously but counseled that she may have increased spotting after the 9th week of active pills and may find it best for her body to take the placebo week in the 3rd pill pack.   Rx provided for norethindrone-ethinyl estradiol-iron    "      (MICROGESTIN FE1.5/30) 1.5-30 MG-MCG tablet    Return to clinic if spotting does not resolve after switching COCs and with the plan outlined above.   Pita expressed understanding and agreement with the plan for care.    A total of 25 minutes was spent in direct contact with the patient and > 50% of the time in patient education and coordination of care.    Coco Yun, DNP, APRN, WHNP

## 2019-12-20 ENCOUNTER — OFFICE VISIT (OUTPATIENT)
Dept: OBGYN | Facility: CLINIC | Age: 29
End: 2019-12-20
Attending: NURSE PRACTITIONER
Payer: COMMERCIAL

## 2019-12-20 VITALS
WEIGHT: 101 LBS | BODY MASS INDEX: 19.07 KG/M2 | DIASTOLIC BLOOD PRESSURE: 75 MMHG | SYSTOLIC BLOOD PRESSURE: 110 MMHG | HEART RATE: 67 BPM | HEIGHT: 61 IN

## 2019-12-20 DIAGNOSIS — Z87.42 HX OF OVARIAN CYST: ICD-10-CM

## 2019-12-20 DIAGNOSIS — N92.0 MENORRHAGIA WITH REGULAR CYCLE: Primary | ICD-10-CM

## 2019-12-20 DIAGNOSIS — Z30.41 SURVEILLANCE OF PREVIOUSLY PRESCRIBED CONTRACEPTIVE PILL: ICD-10-CM

## 2019-12-20 LAB — HEMOGLOBIN: 11.9 G/DL (ref 11.7–15.7)

## 2019-12-20 PROCEDURE — G0463 HOSPITAL OUTPT CLINIC VISIT: HCPCS | Mod: ZF

## 2019-12-20 PROCEDURE — 85018 HEMOGLOBIN: CPT | Mod: ZF | Performed by: NURSE PRACTITIONER

## 2019-12-20 RX ORDER — NORETHINDRONE ACETATE AND ETHINYL ESTRADIOL 1.5-30(21)
1 KIT ORAL DAILY
Qty: 84 TABLET | Refills: 3 | Status: SHIPPED | OUTPATIENT
Start: 2019-12-20 | End: 2021-03-17

## 2019-12-20 ASSESSMENT — ANXIETY QUESTIONNAIRES
1. FEELING NERVOUS, ANXIOUS, OR ON EDGE: NOT AT ALL
7. FEELING AFRAID AS IF SOMETHING AWFUL MIGHT HAPPEN: NOT AT ALL
2. NOT BEING ABLE TO STOP OR CONTROL WORRYING: NOT AT ALL
5. BEING SO RESTLESS THAT IT IS HARD TO SIT STILL: NOT AT ALL
3. WORRYING TOO MUCH ABOUT DIFFERENT THINGS: NOT AT ALL
GAD7 TOTAL SCORE: 0
6. BECOMING EASILY ANNOYED OR IRRITABLE: NOT AT ALL

## 2019-12-20 ASSESSMENT — PATIENT HEALTH QUESTIONNAIRE - PHQ9: 5. POOR APPETITE OR OVEREATING: NOT AT ALL

## 2019-12-20 ASSESSMENT — MIFFLIN-ST. JEOR: SCORE: 1120.51

## 2019-12-20 NOTE — LETTER
2019       RE: Pita Tee  [address]  [address]  [facility] [address]     Dear Colleague,    Thank you for referring your patient, Pita Tee, to the [facility] at [facility]. Please see a copy of my visit note below.    SUBJECTIVE:   Pita Tee is a 29 yr old female, , who presents to clinic today due to spotting with COCs.    Pita has been taking her COCs continuously for the last 4 months in hopes this would help the nausea she was having during the first couple days of a new pill pack after taking the placebo pills. She had only a few days of light spotting within the first 3 months.  However, in month 4, she has now been spotting for almost 3 weeks straight. Denies missing any pills. Light in amount (uses 1-2 pads per day), never passing clots or soaking a pad within an hour. Accompanied by mild to moderate cramping. Reports feeling fatigued; denies lightheadedness, SOB, or weakness.  She denies signs/ symptoms of thyroid dysfunction, high stress, changes in weight, vaginal itching, irritation, or odorous discharge.   Taking the pills continuously did resolve the nausea she experienced with the start of a a new pill pack.     Pita started COCs to prevent ovarian cysts and manage menorrhagia which led to anemia.     Sexual History: Last sexually active 1 yr ago. Declines risk for STDs. Uses condoms when she has been sexually active. Last tested for STDs in 2016.      Last pap smear: 19 NIL    Past Medical History:   Diagnosis Date     Anemia     Due to heavy periods, resolved with hormonal birth control     Dysmenorrhea      Generalised anxiety disorder     Dx age 17; on zoloft for 2 years     IBS (irritable bowel syndrome)     constipation predominant     Migraines      Nephrolithiasis    Hx of migraines, no aura    Past Surgical History:   Procedure Laterality Date     C REMOVAL OF KIDNEY STONE    "    Family History   Problem Relation Age of Onset     Hypertension Maternal Grandfather         Heart attack followed by parmjit/sandeep SANCHEZ Maternal Grandfather         MI     Diabetes Paternal Grandfather      Hyperlipidemia Paternal Grandfather         Over 300     Arthritis Sister      Anxiety Disorder Mother      Neurologic Disorder Mother         Migraines     Neurologic Disorder Maternal Grandmother         Possible Lewy body     Cerebrovascular Disease Maternal Grandmother         Multiple mini-strokes     Breast Cancer Other      Cancer Other         Ovarian     Breast Cancer Other      Asthma Sister         In childhood       OBJECTIVE:  /75 (BP Location: Left arm, Patient Position: Chair)   Pulse 67   Ht 1.549 m (5' 1\")   Wt 45.8 kg (101 lb)   BMI 19.08 kg/m     General: pleasant female in no acute distress  Psych: normal mentation, well oriented  Pelvic Exam:  Vulva: No external lesions, normal hair distribution, no adenopathy  Vagina: Moist, pink, scant amount of blood present, well rugated, no lesions  Cervix: nulliparous, smooth, pink, no visible lesions  Uterus: Normal size, anteverted, non-tender, mobile  Ovaries: No mass, non-tender, mobile    Hemoglobin   Date Value Ref Range Status   12/20/2019 11.9 11.7 - 15.7 g/dL Final       ASSESSMENT & PLAN:  (N92.0) Menorrhagia with regular cycle  (primary encounter diagnosis)  (Z87.42) Hx of Ovarian Cyst  (Z30.41) Surveillance of previously prescribed contraceptive pill    Hemoglobin POCT within the normal range today; no evidence of acute blood loss anemia.  Recommended STD testing, patient declined.   UPT not done as patient is not sexually active. No signs/ symptoms of vaginal infection today.     Recommended switching COCs to one that has a different hormonal balance and includes iron. Recommended continuing current pill pack as she is almost at the end of the active pill weeks and then allowing body to have a withdrawal bleed. Start " new pill pack after completing placebo pills. May continue taking them continuously but counseled that she may have increased spotting after the 9th week of active pills and may find it best for her body to take the placebo week in the 3rd pill pack.   Rx provided for norethindrone-ethinyl estradiol-iron         (MICROGESTIN FE1.5/30) 1.5-30 MG-MCG tablet    Return to clinic if spotting does not resolve after switching COCs and with the plan outlined above.   Pita expressed understanding and agreement with the plan for care.    A total of 25 minutes was spent in direct contact with the patient and > 50% of the time in patient education and coordination of care.    Coco Yun, DNP, APRN, WHNP

## 2019-12-21 ASSESSMENT — ANXIETY QUESTIONNAIRES: GAD7 TOTAL SCORE: 0

## 2020-12-13 ENCOUNTER — HEALTH MAINTENANCE LETTER (OUTPATIENT)
Age: 30
End: 2020-12-13

## 2021-03-17 ENCOUNTER — VIRTUAL VISIT (OUTPATIENT)
Dept: FAMILY MEDICINE | Facility: CLINIC | Age: 31
End: 2021-03-17
Payer: COMMERCIAL

## 2021-03-17 DIAGNOSIS — J02.9 SORE THROAT: ICD-10-CM

## 2021-03-17 DIAGNOSIS — Z83.2 FAMILY HISTORY OF AUTOIMMUNE DISORDER: ICD-10-CM

## 2021-03-17 DIAGNOSIS — R76.8 ELEVATED ANTINUCLEAR ANTIBODY (ANA) LEVEL: Primary | ICD-10-CM

## 2021-03-17 PROCEDURE — 99213 OFFICE O/P EST LOW 20 MIN: CPT | Mod: GT | Performed by: FAMILY MEDICINE

## 2021-03-17 NOTE — NURSING NOTE
Chief Complaint   Patient presents with     Fever     Kimberly Nissen, EMT at 1:37 PM on 3/17/2021    Video Visit Technology for this patient: Lelia Video Visit- Patient was left in waiting room

## 2021-03-17 NOTE — PROGRESS NOTES
Pita is a 31 year old who is being evaluated via a billable video visit.      How would you like to obtain your AVS? MyChart  If the video visit is dropped, the invitation should be resent by: in epic  Will anyone else be joining your video visit? No    Video Start Time: 2:07    Assessment & Plan     Elevated antinuclear antibody (SARAH) level  She is concered if autoimmune.  - CBC with platelets differential; Future  - Comprehensive metabolic panel; Future  - TSH with free T4 reflex; Future  - UA with Micro reflex to Culture; Future  - Erythrocyte sedimentation rate; Future  - Anti Nuclear Ani IgG by IFA with Reflex    Sore throat    - CBC with platelets differential; Future  - Comprehensive metabolic panel; Future  - TSH with free T4 reflex; Future  - UA with Micro reflex to Culture; Future  - Erythrocyte sedimentation rate; Future  - Anti Nuclear Ani IgG by IFA with Reflex    Family history of autoimmune disorder    - CBC with platelets differential; Future  - Comprehensive metabolic panel; Future  - TSH with free T4 reflex; Future  - UA with Micro reflex to Culture; Future  - Erythrocyte sedimentation rate; Future  - Anti Nuclear Ani IgG by IFA with Reflex    Discussed OAS, I suggested she consider see allergist, she will consider and let me know if interested  23 minutes spent on the date of the encounter doing chart review, history and exam, documentation and further activities as noted above      No follow-ups on file.    Baldemar Mercado MD  [facility]   Pita is a 31 year old who presents for the following health issues     HPI For one year, intermittent feels face is hot (not flushed), same time minor nasal congestion and sore throat. Takes temp, about 99.5. Runs it course in a few hours. Typically in the arlin, gone before bed. Occurring right now, afternoon, that is unusual. Has not thought about OAS but unlikely to be infection; recurrent, hours  of symptoms at a time. Once or twice mild cough with. Does have spring/fall environmental allergies.     FH autoimmune; sister on  Humira for undiagnosed disease. Mom w/ HSP.     Feels well otherwise. No rash, GI, CP,  sx.     Generally a few times a mo, usually a few days in a row.            Review of Systems         Objective           Vitals:  No vitals were obtained today due to virtual visit.    Physical Exam   GENERAL: Healthy, alert and no distress  EYES: Eyes grossly normal to inspection.  No discharge or erythema, or obvious scleral/conjunctival abnormalities.  RESP: No audible wheeze, cough, or visible cyanosis.  No visible retractions or increased work of breathing.    SKIN: Visible skin clear. No significant rash, abnormal pigmentation or lesions.  NEURO: Cranial nerves grossly intact.  Mentation and speech appropriate for age.  PSYCH: Mentation appears normal, affect normal/bright, judgement and insight intact, normal speech and appearance well-groomed.            Video-Visit Details    Type of service:  Video Visit    Video End Time:2:24    Originating Location (pt. Location): Home    Distant Location (provider location):  [facility] [place]     Platform used for Video Visit: [employer]

## 2021-03-18 ENCOUNTER — MYC MEDICAL ADVICE (OUTPATIENT)
Dept: INTERNAL MEDICINE | Facility: CLINIC | Age: 31
End: 2021-03-18

## 2021-03-19 ENCOUNTER — TELEPHONE (OUTPATIENT)
Dept: INTERNAL MEDICINE | Facility: CLINIC | Age: 31
End: 2021-03-19

## 2021-03-19 DIAGNOSIS — J02.9 SORE THROAT: ICD-10-CM

## 2021-03-19 DIAGNOSIS — R31.9 BLOOD IN URINE: Primary | ICD-10-CM

## 2021-03-19 DIAGNOSIS — Z83.2 FAMILY HISTORY OF AUTOIMMUNE DISORDER: ICD-10-CM

## 2021-03-19 DIAGNOSIS — R76.8 ELEVATED ANTINUCLEAR ANTIBODY (ANA) LEVEL: ICD-10-CM

## 2021-03-19 LAB
ALBUMIN SERPL-MCNC: 4.1 G/DL (ref 3.4–5)
ALBUMIN UR-MCNC: NEGATIVE MG/DL
ALP SERPL-CCNC: 40 U/L (ref 40–150)
ALT SERPL W P-5'-P-CCNC: 19 U/L (ref 0–50)
ANION GAP SERPL CALCULATED.3IONS-SCNC: 2 MMOL/L (ref 3–14)
APPEARANCE UR: CLEAR
AST SERPL W P-5'-P-CCNC: 6 U/L (ref 0–45)
BASOPHILS # BLD AUTO: 0 10E9/L (ref 0–0.2)
BASOPHILS NFR BLD AUTO: 0.6 %
BILIRUB SERPL-MCNC: 0.5 MG/DL (ref 0.2–1.3)
BILIRUB UR QL STRIP: NEGATIVE
BUN SERPL-MCNC: 14 MG/DL (ref 7–30)
CALCIUM SERPL-MCNC: 8.8 MG/DL (ref 8.5–10.1)
CHLORIDE SERPL-SCNC: 111 MMOL/L (ref 94–109)
CO2 SERPL-SCNC: 28 MMOL/L (ref 20–32)
COLOR UR AUTO: ABNORMAL
CREAT SERPL-MCNC: 0.78 MG/DL (ref 0.52–1.04)
DIFFERENTIAL METHOD BLD: NORMAL
EOSINOPHIL # BLD AUTO: 0.1 10E9/L (ref 0–0.7)
EOSINOPHIL NFR BLD AUTO: 1.1 %
ERYTHROCYTE [DISTWIDTH] IN BLOOD BY AUTOMATED COUNT: 12.4 % (ref 10–15)
ERYTHROCYTE [SEDIMENTATION RATE] IN BLOOD BY WESTERGREN METHOD: 6 MM/H (ref 0–20)
GFR SERPL CREATININE-BSD FRML MDRD: >90 ML/MIN/{1.73_M2}
GLUCOSE SERPL-MCNC: 82 MG/DL (ref 70–99)
GLUCOSE UR STRIP-MCNC: NEGATIVE MG/DL
HCT VFR BLD AUTO: 40.1 % (ref 35–47)
HGB BLD-MCNC: 13.1 G/DL (ref 11.7–15.7)
HGB UR QL STRIP: ABNORMAL
IMM GRANULOCYTES # BLD: 0 10E9/L (ref 0–0.4)
IMM GRANULOCYTES NFR BLD: 0.2 %
KETONES UR STRIP-MCNC: NEGATIVE MG/DL
LEUKOCYTE ESTERASE UR QL STRIP: ABNORMAL
LYMPHOCYTES # BLD AUTO: 1.7 10E9/L (ref 0.8–5.3)
LYMPHOCYTES NFR BLD AUTO: 31.4 %
MCH RBC QN AUTO: 29 PG (ref 26.5–33)
MCHC RBC AUTO-ENTMCNC: 32.7 G/DL (ref 31.5–36.5)
MCV RBC AUTO: 89 FL (ref 78–100)
MONOCYTES # BLD AUTO: 0.4 10E9/L (ref 0–1.3)
MONOCYTES NFR BLD AUTO: 6.4 %
NEUTROPHILS # BLD AUTO: 3.3 10E9/L (ref 1.6–8.3)
NEUTROPHILS NFR BLD AUTO: 60.3 %
NITRATE UR QL: NEGATIVE
NRBC # BLD AUTO: 0 10*3/UL
NRBC BLD AUTO-RTO: 0 /100
PH UR STRIP: 6 PH (ref 5–7)
PLATELET # BLD AUTO: 250 10E9/L (ref 150–450)
POTASSIUM SERPL-SCNC: 4.1 MMOL/L (ref 3.4–5.3)
PROT SERPL-MCNC: 7.4 G/DL (ref 6.8–8.8)
RBC # BLD AUTO: 4.52 10E12/L (ref 3.8–5.2)
RBC #/AREA URNS AUTO: 3 /HPF (ref 0–2)
SODIUM SERPL-SCNC: 140 MMOL/L (ref 133–144)
SOURCE: ABNORMAL
SP GR UR STRIP: 1.01 (ref 1–1.03)
SQUAMOUS #/AREA URNS AUTO: 1 /HPF (ref 0–1)
TSH SERPL DL<=0.005 MIU/L-ACNC: 1.92 MU/L (ref 0.4–4)
UROBILINOGEN UR STRIP-MCNC: 0 MG/DL (ref 0–2)
WBC # BLD AUTO: 5.5 10E9/L (ref 4–11)
WBC #/AREA URNS AUTO: 1 /HPF (ref 0–5)

## 2021-03-19 PROCEDURE — 81001 URINALYSIS AUTO W/SCOPE: CPT | Performed by: PATHOLOGY

## 2021-03-19 PROCEDURE — 86039 ANTINUCLEAR ANTIBODIES (ANA): CPT | Performed by: PATHOLOGY

## 2021-03-19 PROCEDURE — 86038 ANTINUCLEAR ANTIBODIES: CPT | Performed by: PATHOLOGY

## 2021-03-19 PROCEDURE — 80050 GENERAL HEALTH PANEL: CPT | Performed by: PATHOLOGY

## 2021-03-19 PROCEDURE — 85652 RBC SED RATE AUTOMATED: CPT | Performed by: PATHOLOGY

## 2021-03-19 PROCEDURE — 36415 COLL VENOUS BLD VENIPUNCTURE: CPT | Performed by: PATHOLOGY

## 2021-03-19 NOTE — TELEPHONE ENCOUNTER
Spoke with pt.  She was in her period when collecting the urine sample. She will redo the UA some time next week.    Soon-Mi  -------------------------------------------------------------      ----- Message from Baldemar Mercado MD sent at 3/19/2021 11:55 AM CDT -----  Can you call her. Urine test shows a little blood in urine, just enough to note. Ask if having period when sample collected. If not, I'd consider asking her to see Urology for this, and perhaps Nephrology, her mom had Henoch Schonlein Purpura which can show up as blood in urine although often many other things too.

## 2021-03-23 LAB
ANA PAT SER IF-IMP: ABNORMAL
ANA SER QL IF: POSITIVE
ANA TITR SER IF: ABNORMAL {TITER}

## 2021-03-31 DIAGNOSIS — R31.9 BLOOD IN URINE: ICD-10-CM

## 2021-03-31 LAB
ALBUMIN UR-MCNC: NEGATIVE MG/DL
APPEARANCE UR: CLEAR
BACTERIA #/AREA URNS HPF: ABNORMAL /HPF
BILIRUB UR QL STRIP: NEGATIVE
COLOR UR AUTO: ABNORMAL
GLUCOSE UR STRIP-MCNC: NEGATIVE MG/DL
HGB UR QL STRIP: ABNORMAL
KETONES UR STRIP-MCNC: NEGATIVE MG/DL
LEUKOCYTE ESTERASE UR QL STRIP: NEGATIVE
NITRATE UR QL: NEGATIVE
PH UR STRIP: 6 PH (ref 5–7)
RBC #/AREA URNS AUTO: 1 /HPF (ref 0–2)
SOURCE: ABNORMAL
SP GR UR STRIP: 1.01 (ref 1–1.03)
SQUAMOUS #/AREA URNS AUTO: 1 /HPF (ref 0–1)
UROBILINOGEN UR STRIP-MCNC: 0 MG/DL (ref 0–2)
WBC #/AREA URNS AUTO: 1 /HPF (ref 0–5)

## 2021-03-31 PROCEDURE — 81001 URINALYSIS AUTO W/SCOPE: CPT | Performed by: PATHOLOGY

## 2021-04-21 ENCOUNTER — MYC MEDICAL ADVICE (OUTPATIENT)
Dept: FAMILY MEDICINE | Facility: CLINIC | Age: 31
End: 2021-04-21

## 2021-04-21 DIAGNOSIS — R76.8 ANA POSITIVE: Primary | ICD-10-CM

## 2021-04-27 NOTE — TELEPHONE ENCOUNTER
"Let her know usually it takes quite a while to see a rheumatologist, but as a new thing we can do \"e consults\" where they read the chart and look at labs and reply based on that, I could try to do \"e consult\". I'm not sure if she has a rheumatologic condition or not; positive SARAH lab is quite common and is often not connected to a disease. Her mom has Henoch Schonlein Purpura, and a sister on Humira for some autoimmune disorder. If ok w/ her I'll try to do \"e consult\" just to get rheumatologists opinion on what we know so far.    The spots could be \"cherry angiomas\", harmless, but hard to know without seeing. The dizzy spells could be benign vertigo, or even a form of migraine, but again hard to know without digging deeper.     Offer her appt in person in May to review all of these things and look at the spots.  "

## 2021-05-06 ENCOUNTER — E-CONSULT (OUTPATIENT)
Dept: RHEUMATOLOGY | Facility: CLINIC | Age: 31
End: 2021-05-06
Payer: COMMERCIAL

## 2021-05-06 NOTE — PROGRESS NOTES
5/6/2021     E-Consult has been denied due to: Complexity of question, needs in-person referral.    Interprofessional consultation requested by:  Baldemar Mercado MD      Clinical Question/Purpose: Question is approach to positive SARAH, skin rash, fevers.  Diverse nonspecific symptoms suggest the possible SARAH associated autoimmune syndrome.  In person rheumatology assessment is necessary for provision of a comprehensive diagnostic and therapeutic plan.    Patient assessment and information reviewed: Reviewed providers report and E consult information    Recommendations: Patient should be seen in rheumatology for symptoms associated with positive SARAH within 4 weeks.  With providers permission, I will facilitate consultation in rheumatology.      The recommendations provided in this E-Consult are based on the clinical data available to me at this time, and are furnished without the benefit of a comprehensive in-person or virtual patient evaluation, Any new clinical issues or changes in patient status since the filing of this E-Consult will need to be taken into account when assessing these recommendations. Please contact me if you have further questions.    My total time spent reviewing clinical information and formulating assessment was 5 minutes.    Report sent automatically to requesting provider once signed.       Giovanni Hagen MD

## 2021-05-11 NOTE — PROGRESS NOTES
Pt has been offered a new appt with Dr. Graham Wahl.  She will see him on Friday May 14th at 8 am.    Chante Pena RN  Rheumatology Clinic

## 2021-05-13 NOTE — TELEPHONE ENCOUNTER
NOTES Status Details   OFFICE NOTE from referring provider Internal 04.21.2021 Baldemar Mercado MD   OFFICE NOTE from other specialist N/A    DISCHARGE SUMMARY from hospital N/A    DISCHARGE REPORT from the ER N/A    MEDICATION LIST Internal    LABS (Any and all labs)      Internal    Biopsy/pathology (Anything related to diagnoses I.e. fluid aspirations, lip biopsy, muscle biopsy)      N/A     N/A    Imaging (All imaging related to diagnoses)     Echo N/A    HRCT N/A    CXR N/A    EMG N/A                   Scleroderma/Dermatomyositis diagnoses     Previous Cardiology notes  N/A    Previous Pulmonary notes N/A    Previous Dermatology notes N/A    Previous GI notes N/A    Lupus diagnoses     Previous Nephrology notes N/A    Previous Dermatology notes N/A    Previous Cardiology notes N/A

## 2021-05-14 ENCOUNTER — OFFICE VISIT (OUTPATIENT)
Dept: RHEUMATOLOGY | Facility: CLINIC | Age: 31
End: 2021-05-14
Attending: INTERNAL MEDICINE
Payer: COMMERCIAL

## 2021-05-14 ENCOUNTER — PRE VISIT (OUTPATIENT)
Dept: RHEUMATOLOGY | Facility: CLINIC | Age: 31
End: 2021-05-14

## 2021-05-14 VITALS
HEART RATE: 97 BPM | OXYGEN SATURATION: 97 % | BODY MASS INDEX: 19.06 KG/M2 | SYSTOLIC BLOOD PRESSURE: 110 MMHG | DIASTOLIC BLOOD PRESSURE: 73 MMHG | WEIGHT: 100.9 LBS

## 2021-05-14 DIAGNOSIS — R76.8 POSITIVE ANA (ANTINUCLEAR ANTIBODY): Primary | ICD-10-CM

## 2021-05-14 DIAGNOSIS — M25.542 JOINT PAIN IN FINGERS OF BOTH HANDS: ICD-10-CM

## 2021-05-14 DIAGNOSIS — R76.8 POSITIVE ANA (ANTINUCLEAR ANTIBODY): ICD-10-CM

## 2021-05-14 DIAGNOSIS — R31.21 ASYMPTOMATIC MICROSCOPIC HEMATURIA: ICD-10-CM

## 2021-05-14 DIAGNOSIS — M25.541 JOINT PAIN IN FINGERS OF BOTH HANDS: ICD-10-CM

## 2021-05-14 LAB
ALBUMIN UR-MCNC: NEGATIVE MG/DL
APPEARANCE UR: CLEAR
BACTERIA #/AREA URNS HPF: ABNORMAL /HPF
BILIRUB UR QL STRIP: NEGATIVE
COLOR UR AUTO: ABNORMAL
GLUCOSE UR STRIP-MCNC: NEGATIVE MG/DL
HGB UR QL STRIP: ABNORMAL
KETONES UR STRIP-MCNC: NEGATIVE MG/DL
LEUKOCYTE ESTERASE UR QL STRIP: NEGATIVE
MUCOUS THREADS #/AREA URNS LPF: PRESENT /LPF
NITRATE UR QL: NEGATIVE
PH UR STRIP: 7 PH (ref 5–7)
RBC #/AREA URNS AUTO: 1 /HPF (ref 0–2)
SOURCE: ABNORMAL
SP GR UR STRIP: 1.01 (ref 1–1.03)
SQUAMOUS #/AREA URNS AUTO: 2 /HPF (ref 0–1)
UROBILINOGEN UR STRIP-MCNC: 0 MG/DL (ref 0–2)
WBC #/AREA URNS AUTO: 1 /HPF (ref 0–5)

## 2021-05-14 PROCEDURE — G0463 HOSPITAL OUTPT CLINIC VISIT: HCPCS

## 2021-05-14 PROCEDURE — 99205 OFFICE O/P NEW HI 60 MIN: CPT | Performed by: INTERNAL MEDICINE

## 2021-05-14 PROCEDURE — 81001 URINALYSIS AUTO W/SCOPE: CPT | Performed by: PATHOLOGY

## 2021-05-14 ASSESSMENT — JOINT PAIN
TOTAL NUMBER OF SWOLLEN JOINTS: 0
TOTAL NUMBER OF TENDER JOINTS: 0

## 2021-05-14 ASSESSMENT — PAIN SCALES - GENERAL: PAINLEVEL: MILD PAIN (2)

## 2021-05-14 NOTE — PROGRESS NOTES
SUBJECTIVE:  The patient is a 31 year old female seen in consult at request of Blademar Mercado.    CC is positive SARAH 1:160 DFS pattern, joint pain, hematuria.    HPI She has not noticed any joint redness/swelling. She has not had a facial rash. She has noticed joint pain in the fingers when typing for a while. She also had joint pain in the hips in the morning that gets better during the day and she has some knee pain as well at times. No swelling. No limitation in getting up the stairs to her apartment on the 5th floor.    HISTORY REVIEW:  Past Medical History:   Diagnosis Date     Anemia 2006    Due to heavy periods, resolved with hormonal birth control     Dysmenorrhea      Generalised anxiety disorder     Dx age 17; on zoloft for 2 years     IBS (irritable bowel syndrome)     constipation predominant     Migraines      Nephrolithiasis      Past Surgical History:   Procedure Laterality Date     C REMOVAL OF KIDNEY STONE       Family history  Sister with RA on Enbrel  Mother with RA and history of several episodes of HSP and recurrent hematuria  No thyroid disease in the family    Patient Active Problem List   Diagnosis     Positive SARAH (antinuclear antibody)     Allergies   Allergen Reactions     Macrobid [Nitrofurantoin]      headache       Social history  Non smoker, social alcohol intake. Works in finance, from home currently. Is getting her second COVID shot today.    ADDITIONAL REVIEW:  Meds reviewed yes  Last Labs: yes  Records in EPIC: yes    Vitals: Blood pressure 110/73, pulse 97, weight 45.8 kg (100 lb 14.4 oz), SpO2 97 %  AM Gelling No  Energy Level: normal    REVIEW OF SYMPTOMS:  Fevers: subjective but not higher than 100  Rash: no  Oral Ulcers: intermittent  SOB: no  Nausea: no  Diarrhea: no  Joint pain yes, knees and hips  Dysuria: no   Hematuria: yes, twice U/A done while having her period      OBJECTIVE:  PHYSICAL EXAM  Joint exam reveals:  No active synovitis in any joint in he upper and lower  extremities.  General Physical Exam:  Constitutional: WD-WN-WG cooperative  Eyes: nl EOM, PERRLA, vision, conjunctiva, sclera  Resp: no wheezing  Abdominal exam, minor rash left of umbilicus  Lymph: no adenopathy  MS: All neck, shoulder, elbow, wrist, MCP/PIP/DIP, spine, hip, knee, ankle, and foot MTP/IP joints were examined and  found normal.. No active synovitis or deformity. Full ROM.  Normal  strength  Skin: no rash elsewhere. Has a few cherry spots  Psych: nl judgement, orientation, memory, affect.    ASSESSMENT:  1. Low titer pos SARAH 1:160, DFS pattern.   2. Other recent blood tests essentially normal.  3. Hematura, microscopic, albeit minimal, and urine samples were obtained during her period.  4. Joint pain    IMPRESSION:  1. No evidence of RA or SLE. No active synovitis today.  2. She has a family history with autoimmune disease, RA in sister and mother, hence we should watch for synovitis.  3. Hematuria is likely due to the time of collection.    PLAN:  1. Repeat U/A now  2. Reassurance that there is no evidence for SLE or RA at this time. Discussed what to expect in theses conditions.  3. F/U needed if U/A is abnormal.     I spent 60 minutes face to face with the patient and more than 50% was used for counseling and/or coordination of care

## 2021-05-14 NOTE — PROGRESS NOTES
Chief Complaint   Patient presents with     New Patient     new         /73 (BP Location: Right arm, Patient Position: Sitting)   Pulse 97   Wt 45.8 kg (100 lb 14.4 oz)   SpO2 97%   BMI 19.06 kg/m        Robert Rich, EMT

## 2021-05-14 NOTE — LETTER
5/14/2021       RE: Pita Tee  [address]  [address]  [facility] [address]     Dear Colleague,    Thank you for referring your patient, Pita Tee, to the [facility] [place] [facility]. Please see a copy of my visit note below.    Chief Complaint   Patient presents with     New Patient     new         /73 (BP Location: Right arm, Patient Position: Sitting)   Pulse 97   Wt 45.8 kg (100 lb 14.4 oz)   SpO2 97%   BMI 19.06 kg/m        Robert Rich, EMT      SUBJECTIVE:  The patient is a 31 year old female seen in consult at request of Baldemar Mercado.    CC is positive SARAH 1:160 DFS pattern, joint pain, hematuria.    HPI She has not noticed any joint redness/swelling. She has not had a facial rash. She has noticed joint pain in the fingers when typing for a while. She also had joint pain in the hips in the morning that gets better during the day and she has some knee pain as well at times. No swelling. No limitation in getting up the stairs to her apartment on the 5th floor.    HISTORY REVIEW:  Past Medical History:   Diagnosis Date     Anemia 2006    Due to heavy periods, resolved with hormonal birth control     Dysmenorrhea      Generalised anxiety disorder     Dx age 17; on zoloft for 2 years     IBS (irritable bowel syndrome)     constipation predominant     Migraines      Nephrolithiasis      Past Surgical History:   Procedure Laterality Date     C REMOVAL OF KIDNEY STONE       Family history  Sister with RA on Enbrel  Mother with RA and history of several episodes of HSP and recurrent hematuria  No thyroid disease in the family    Patient Active Problem List   Diagnosis     Positive SARAH (antinuclear antibody)     Allergies   Allergen Reactions     Macrobid [Nitrofurantoin]      headache       Social history  Non smoker, social alcohol intake. Works in finance, from home currently. 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    ADDITIONAL REVIEW:  Meds reviewed yes  Last Labs: yes  Records in EPIC: yes    Vitals: Blood pressure 110/73, pulse 97, weight 45.8 kg (100 lb 14.4 oz), SpO2 97 %  AM Gelling No  Energy Level: normal    REVIEW OF SYMPTOMS:  Fevers: subjective but not higher than 100  Rash: no  Oral Ulcers: intermittent  SOB: no  Nausea: no  Diarrhea: no  Joint pain yes, knees and hips  Dysuria: no   Hematuria: yes, twice U/A done while having her period      OBJECTIVE:  PHYSICAL EXAM  Joint exam reveals:  No active synovitis in any joint in he upper and lower extremities.  General Physical Exam:  Constitutional: WD-WN-WG cooperative  Eyes: nl EOM, PERRLA, vision, conjunctiva, sclera  Resp: no wheezing  Abdominal exam, minor rash left of umbilicus  Lymph: no adenopathy  MS: All neck, shoulder, elbow, wrist, MCP/PIP/DIP, spine, hip, knee, ankle, and foot MTP/IP joints were examined and  found normal.. No active synovitis or deformity. Full ROM.  Normal  strength  Skin: no rash elsewhere. Has a few cherry spots  Psych: nl judgement, orientation, memory, affect.    ASSESSMENT:  1. Low titer pos ASRAH 1:160, DFS pattern.   2. Other recent blood tests essentially normal.  3. Hematura, microscopic, albeit minimal, and urine samples were obtained during her period.  4. Joint pain    IMPRESSION:  1. No evidence of RA or SLE. No active synovitis today.  2. She has a family history with autoimmune disease, RA in sister and mother, hence we should watch for synovitis.  3. Hematuria is likely due to the time of collection.    PLAN:  1. Repeat U/A now  2. Reassurance that there is no evidence for SLE or RA at this time. Discussed what to expect in theses conditions.  3. F/U needed if U/A is abnormal.     I spent 60 minutes face to face with the patient and more than 50% was used for counseling and/or coordination of care      Graham Wahl MD

## 2021-05-15 ASSESSMENT — ENCOUNTER SYMPTOMS
MYALGIAS: 0
NECK PAIN: 0
POLYPHAGIA: 0
STIFFNESS: 0
INCREASED ENERGY: 0
CHILLS: 0
WEIGHT GAIN: 0
ALTERED TEMPERATURE REGULATION: 0
NIGHT SWEATS: 0
ARTHRALGIAS: 1
HALLUCINATIONS: 0
WEIGHT LOSS: 0
POLYDIPSIA: 0
BACK PAIN: 0
MUSCLE WEAKNESS: 0
MUSCLE CRAMPS: 0
JOINT SWELLING: 0
DECREASED APPETITE: 0
FEVER: 1
FATIGUE: 0

## 2021-05-19 ENCOUNTER — OFFICE VISIT (OUTPATIENT)
Dept: FAMILY MEDICINE | Facility: CLINIC | Age: 31
End: 2021-05-19
Payer: COMMERCIAL

## 2021-05-19 VITALS
WEIGHT: 99.8 LBS | BODY MASS INDEX: 18.86 KG/M2 | RESPIRATION RATE: 18 BRPM | SYSTOLIC BLOOD PRESSURE: 109 MMHG | OXYGEN SATURATION: 96 % | DIASTOLIC BLOOD PRESSURE: 72 MMHG | HEART RATE: 64 BPM

## 2021-05-19 DIAGNOSIS — Z00.00 ROUTINE GENERAL MEDICAL EXAMINATION AT A HEALTH CARE FACILITY: ICD-10-CM

## 2021-05-19 DIAGNOSIS — Z00.00 ROUTINE GENERAL MEDICAL EXAMINATION AT A HEALTH CARE FACILITY: Primary | ICD-10-CM

## 2021-05-19 LAB
CHOLEST SERPL-MCNC: 130 MG/DL
GLUCOSE SERPL-MCNC: 78 MG/DL (ref 70–99)
HDLC SERPL-MCNC: 70 MG/DL
LDLC SERPL CALC-MCNC: 52 MG/DL
NONHDLC SERPL-MCNC: 60 MG/DL
TRIGL SERPL-MCNC: 43 MG/DL

## 2021-05-19 PROCEDURE — 80061 LIPID PANEL: CPT | Performed by: PATHOLOGY

## 2021-05-19 PROCEDURE — 99395 PREV VISIT EST AGE 18-39: CPT | Performed by: FAMILY MEDICINE

## 2021-05-19 PROCEDURE — 82947 ASSAY GLUCOSE BLOOD QUANT: CPT | Performed by: PATHOLOGY

## 2021-05-19 PROCEDURE — 36415 COLL VENOUS BLD VENIPUNCTURE: CPT | Performed by: PATHOLOGY

## 2021-05-19 ASSESSMENT — PAIN SCALES - GENERAL: PAINLEVEL: NO PAIN (0)

## 2021-05-19 NOTE — PROGRESS NOTES
Assessment & Plan     Routine general medical examination at a health care facility  Due  - Lipid panel reflex to direct LDL Fasting; Future  - Glucose; Future      25 minutes spent on the date of the encounter doing chart review, history and exam, documentation and further activities per the note    Baldemar Mercado MD  [facility] [place]    Jazmyn Lema is a 31 year old who presents for the following health issues     HPI Here for physical. Needs lipids, sugar for insurance and form done; all done today she took the form.  Rheum notes rvwd, just f/u prn there discussed vararies of rheum processes, mom HSP sister ill defined autoimmune process    Eats healhty, exercises    Past Medical History:   Diagnosis Date     Anemia 2006    Due to heavy periods, resolved with hormonal birth control     Dysmenorrhea      Generalised anxiety disorder     Dx age 17; on zoloft for 2 years     IBS (irritable bowel syndrome)     constipation predominant     Migraines      Nephrolithiasis      Past Surgical History:   Procedure Laterality Date     C REMOVAL OF KIDNEY STONE       Current Outpatient Medications   Medication     fexofenadine (ALLEGRA) 180 MG tablet     Ibuprofen (ADVIL PO)     No current facility-administered medications for this visit.      Allergies   Allergen Reactions     Macrobid [Nitrofurantoin]      headache       Family History   Problem Relation Age of Onset     Hypertension Maternal Grandfather         Heart attack followed by a-fib/flutter     C.A.D. Maternal Grandfather         MI     Diabetes Paternal Grandfather      Hyperlipidemia Paternal Grandfather         Over 300     Arthritis Sister      Anxiety Disorder Mother      Neurologic Disorder Mother         Migraines     Neurologic Disorder Maternal Grandmother         Possible Lewy body     Cerebrovascular Disease Maternal Grandmother         Multiple mini-strokes     Breast Cancer Other      Cancer Other          Ovarian     Breast Cancer Other      Asthma Sister         In childhood     Social History     Socioeconomic History     Marital status: Single     Spouse name: Not on file     Number of children: Not on file     Years of education: Not on file     Highest education level: Not on file   Occupational History     Occupation: retail clerical     Employer: [employer]   Social Needs     Financial resource strain: Not on file     Food insecurity     Worry: Not on file     Inability: Not on file     Transportation needs     Medical: Not on file     Non-medical: Not on file   Tobacco Use     Smoking status: Never Smoker     Smokeless tobacco: Never Used   Substance and Sexual Activity     Alcohol use: Yes     Comment: social     Drug use: No     Sexual activity: Not Currently     Partners: Male     Birth control/protection: Condom, Pill   Lifestyle     Physical activity     Days per week: Not on file     Minutes per session: Not on file     Stress: Not on file   Relationships     Social connections     Talks on phone: Not on file     Gets together: Not on file     Attends Voodoo service: Not on file     Active member of club or organization: Not on file     Attends meetings of clubs or organizations: Not on file     Relationship status: Not on file     Intimate partner violence     Fear of current or ex partner: Not on file     Emotionally abused: Not on file     Physically abused: Not on file     Forced sexual activity: Not on file   Other Topics Concern     Parent/sibling w/ CABG, MI or angioplasty before 65F 55M? 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 Every day walking    How much calcium per day? foods       How much caffeine per day? 3    How much vitamin D per day? In foods and dunlight    Do you/your family wear seatbelts?  Yes    Do you/your family use safety helmets? Yes    Do you/your family use sunscreen? Yes    Do you/your family keep firearms in the home? No    Do you/your family have a smoke detector(s)? Yes        Reviewed cmckim lpn 8-         Current Outpatient Medications   Medication     fexofenadine (ALLEGRA) 180 MG tablet     Ibuprofen (ADVIL PO)     No current facility-administered medications for this visit.      Allergies   Allergen Reactions     Macrobid [Nitrofurantoin]      headache           Past Medical History:   Diagnosis Date     Anemia 2006    Due to heavy periods, resolved with hormonal birth control     Dysmenorrhea      Generalised anxiety disorder     Dx age 17; on zoloft for 2 years     IBS (irritable bowel syndrome)     constipation predominant     Migraines      Nephrolithiasis      Past Surgical History:   Procedure Laterality Date     C REMOVAL OF KIDNEY STONE       Current Outpatient Medications   Medication     fexofenadine (ALLEGRA) 180 MG tablet     Ibuprofen (ADVIL PO)     No current facility-administered medications for this visit.      Allergies   Allergen Reactions     Macrobid [Nitrofurantoin]      headache       Family History   Problem Relation Age of Onset     Hypertension Maternal Grandfather         Heart attack followed by a-alexandra/sandeep     CBozenaASERJIO Maternal Grandfather         MI     Diabetes Paternal Grandfather      Hyperlipidemia Paternal Grandfather         Over 300     Arthritis Sister      Anxiety Disorder Mother      Neurologic Disorder Mother         Migraines     Neurologic Disorder Maternal Grandmother          Possible Lewy body     Cerebrovascular Disease Maternal Grandmother         Multiple mini-strokes     Breast Cancer Other      Cancer Other         Ovarian     Breast Cancer Other      Asthma Sister         In childhood     Dad  Parathyroid disease        Review of Systems   Answers for HPI/ROS submitted by the patient on 5/15/2021   General Symptoms: Yes  Skin Symptoms: No  HENT Symptoms: No  EYE SYMPTOMS: No  HEART SYMPTOMS: No  LUNG SYMPTOMS: No  INTESTINAL SYMPTOMS: No  URINARY SYMPTOMS: No  GYNECOLOGIC SYMPTOMS: No  BREAST SYMPTOMS: No  SKELETAL SYMPTOMS: Yes  BLOOD SYMPTOMS: No  NERVOUS SYSTEM SYMPTOMS: No  MENTAL HEALTH SYMPTOMS: No  Loss of appetite: No  Weight loss: No  Weight gain: No  Night sweats: No  Increased stress: No  Feeling hot or cold when others believe the temperature is normal: No  Loss of height: No  Post-operative complications: No  Surgical site pain: No  Change in or Loss of Energy: No  Hyperactivity: No  Confusion: No  Bone pain: No  Muscle cramps: No  Muscle weakness: No  Joint stiffness: No  Bone fracture: No        Objective    /72 (BP Location: Right arm, Patient Position: Sitting, Cuff Size: Adult Small)   Pulse 64   Resp 18   Wt 45.3 kg (99 lb 12.8 oz)   SpO2 96%   BMI 18.86 kg/m    Body mass index is 18.86 kg/m .  Physical Exam   GENERAL: healthy, alert and no distress  EYES: Eyes grossly normal to inspection, PERRL and conjunctivae and sclerae normal  HENT: ear canals and TM's normal, nose and mouth without ulcers or lesions  NECK: no adenopathy, no asymmetry, masses, or scars and thyroid normal to palpation  RESP: lungs clear to auscultation - no rales, rhonchi or wheezes  CV: regular rate and rhythm, normal S1 S2, no S3 or S4, no murmur, click or rub, no peripheral edema and peripheral pulses strong  ABDOMEN: soft, nontender, no hepatosplenomegaly, no masses and bowel sounds normal  MS: no gross musculoskeletal defects noted, no edema  SKIN: no suspicious lesions or  rashes  NEURO: Normal strength and tone, mentation intact and speech normal  PSYCH: mentation appears normal, affect normal/bright

## 2021-05-19 NOTE — NURSING NOTE
Chief Complaint   Patient presents with     Physical       Tarik Davis CMA (AAMA) at 9:42 AM on 5/19/2021

## 2021-07-19 ENCOUNTER — TELEPHONE (OUTPATIENT)
Dept: SLEEP MEDICINE | Facility: CLINIC | Age: 31
End: 2021-07-19

## 2021-09-26 ENCOUNTER — HEALTH MAINTENANCE LETTER (OUTPATIENT)
Age: 31
End: 2021-09-26

## 2022-05-09 ENCOUNTER — VIRTUAL VISIT (OUTPATIENT)
Dept: FAMILY MEDICINE | Facility: CLINIC | Age: 32
End: 2022-05-09
Payer: COMMERCIAL

## 2022-05-09 VITALS — BODY MASS INDEX: 18.5 KG/M2 | WEIGHT: 98 LBS | HEIGHT: 61 IN

## 2022-05-09 DIAGNOSIS — G43.909 MIGRAINE WITHOUT STATUS MIGRAINOSUS, NOT INTRACTABLE, UNSPECIFIED MIGRAINE TYPE: Primary | ICD-10-CM

## 2022-05-09 PROCEDURE — 99213 OFFICE O/P EST LOW 20 MIN: CPT | Mod: GT | Performed by: FAMILY MEDICINE

## 2022-05-09 NOTE — LETTER
Pita Tee  [address] [address]  : 1990      May 9, 2022          To whom it may concern:    Pita is my patient. For medical reasons, please allow her to work from home.     Baldemar Mercado MD

## 2022-05-09 NOTE — PROGRESS NOTES
Patient confirms medications and allergies are accurate via patients echeck in completion, and or denies any changes since last reviewed/verified.     Zandra Alfonso, Virtual Facilitator      Pita is a 32 year old who is being evaluated via a billable video visit.      How would you like to obtain your AVS? MyChart  If the video visit is dropped, the invitation should be resent by: Text to cell phone: [phone]  Will anyone else be joining your video visit? No    Video Start Time: 8 am  Video-Visit Details    Type of service:  Video Visit    Video End Time:8:22    Originating Location (pt. 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        Assessment & Plan     Migraine without status migrainosus, not intractable, unspecified migraine type  1-letter for work from home due to severe HA from driving  - Adult Neurology  Referral  - Rimegepant Sulfate 75 MG TBDP; Take 1 tablet by mouth daily as needed For migraine      28 minutes spent on the date of the encounter doing chart review, history and exam, documentation and further activities per the note    No follow-ups on file.    Baldemar Mercado MD  [facility]    Jazmyn Lema is a 32 year old who presents for the following health issues   HPI above. HA triggered by menses one/mo, and driving too. So after six weeks driving to from work mutliple severe limiting HA.   Moms nurtec helps/tolerated after trying many things.  Also gets tension HA too  Discussed our HA clinic  DIscussed prophy use vs acute use nurtec    Past Medical History:   Diagnosis Date     Anemia 2006    Due to heavy periods,  resolved with hormonal birth control     Dysmenorrhea      Generalised anxiety disorder     Dx age 17; on zoloft for 2 years     IBS (irritable bowel syndrome)     constipation predominant     Migraines      Nephrolithiasis      Past Surgical History:   Procedure Laterality Date     ZZC REMOVAL OF KIDNEY STONE       Current Outpatient Medications   Medication     Rimegepant Sulfate 75 MG TBDP     fexofenadine (ALLEGRA) 180 MG tablet     Ibuprofen (ADVIL PO)     No current facility-administered medications for this visit.     Allergies   Allergen Reactions     Macrobid [Nitrofurantoin]      headache       We note she has no other history since I saw her a year ago      Review of Systems         Objective           Vitals:  No vitals were obtained today due to virtual visit.    Physical Exam   GENERAL: Healthy, alert and no distress  EYES: Eyes grossly normal to inspection.  No discharge or erythema, or obvious scleral/conjunctival abnormalities.  RESP: No audible wheeze, cough, or visible cyanosis.  No visible retractions or increased work of breathing.    SKIN: Visible skin clear. No significant rash, abnormal pigmentation or lesions.  NEURO: Cranial nerves grossly intact.  Mentation and speech appropriate for age.  PSYCH: Mentation appears normal, affect normal/bright, judgement and insight intact, normal speech and appearance well-groomed.            Video-Visit Details    Type of service:  Video Visit

## 2022-07-03 ENCOUNTER — HEALTH MAINTENANCE LETTER (OUTPATIENT)
Age: 32
End: 2022-07-03

## 2022-07-23 NOTE — TELEPHONE ENCOUNTER
FUTURE VISIT INFORMATION      FUTURE VISIT INFORMATION:    Date: 8/8/2022    Time: 130pm    Location: [facility]  REFERRAL INFORMATION:    Referring provider:  Dr. Mercado    [place]  [facility] [place]     Reason for visit/diagnosis  Headaches     RECORDS REQUESTED FROM:       Clinic name Comments Records Status Imaging Status   Internal Dr. Mercado-5/9/2022 Epic No Images

## 2022-08-03 ASSESSMENT — HEADACHE IMPACT TEST (HIT 6)
HIT6 TOTAL SCORE: 62
HOW OFTEN HAVE YOU FELT TOO TIRED TO WORK BECAUSE OF YOUR HEADACHES: SOMETIMES
HOW OFTEN HAVE YOU FELT FED UP OR IRRITATED BECAUSE OF YOUR HEADACHES: VERY OFTEN
HOW OFTEN DID HEADACHS LIMIT CONCENTRATION ON WORK OR DAILY ACTIVITY: SOMETIMES
WHEN YOU HAVE A HEADACHE HOW OFTEN DO YOU WISH YOU COULD LIE DOWN: VERY OFTEN
WHEN YOU HAVE HEADACHES HOW OFTEN IS THE PAIN SEVERE: SOMETIMES
HOW OFTEN DO HEADACHES LIMIT YOUR DAILY ACTIVITIES: SOMETIMES

## 2022-08-08 ENCOUNTER — TELEPHONE (OUTPATIENT)
Dept: NEUROLOGY | Facility: CLINIC | Age: 32
End: 2022-08-08

## 2022-08-08 ENCOUNTER — PRE VISIT (OUTPATIENT)
Dept: NEUROLOGY | Facility: CLINIC | Age: 32
End: 2022-08-08

## 2022-08-08 ENCOUNTER — VIRTUAL VISIT (OUTPATIENT)
Dept: NEUROLOGY | Facility: CLINIC | Age: 32
End: 2022-08-08
Attending: FAMILY MEDICINE
Payer: COMMERCIAL

## 2022-08-08 DIAGNOSIS — G43.909 MIGRAINE WITHOUT STATUS MIGRAINOSUS, NOT INTRACTABLE, UNSPECIFIED MIGRAINE TYPE: ICD-10-CM

## 2022-08-08 DIAGNOSIS — G43.719 INTRACTABLE CHRONIC MIGRAINE WITHOUT AURA AND WITHOUT STATUS MIGRAINOSUS: ICD-10-CM

## 2022-08-08 PROCEDURE — 99205 OFFICE O/P NEW HI 60 MIN: CPT | Mod: GT | Performed by: PSYCHIATRY & NEUROLOGY

## 2022-08-08 RX ORDER — ERENUMAB-AOOE 70 MG/ML
70 INJECTION SUBCUTANEOUS
Qty: 1 ML | Refills: 11 | Status: SHIPPED | OUTPATIENT
Start: 2022-08-08 | End: 2022-09-07

## 2022-08-08 ASSESSMENT — MIGRAINE DISABILITY ASSESSMENT (MIDAS)
HOW MANY DAYS WAS YOUR PRODUCTIVITY CUT IN HALF BECAUSE OF HEADACHES: 45
HOW OFTEN WERE SOCIAL ACTIVITIES MISSED DUE TO HEADACHES: 5
HOW MANY DAYS DID YOU MISS WORK OR SCHOOL BECAUSE OF HEADACHES: 3
ON A SCALE FROM 0-10 ON AVERAGE HOW PAINFUL WERE HEADACHES: 4
HOW MANY DAYS DID YOU NOT DO HOUSEWORK BECAUSE OF HEADACHES: 0
TOTAL SCORE: 73
HOW MANY DAYS IN THE PAST 3 MONTHS HAVE YOU HAD A HEADACHE: 85
HOW MANY DAYS WAS HOUSEWORK PRODUCTIVITY CUT IN HALF DUE TO HEADACHES: 20

## 2022-08-08 NOTE — TELEPHONE ENCOUNTER
Prior Authorization Retail Medication Request    Medication/Dose: erenumab-aooe (AIMOVIG) 70 MG/ML injection  ICD code (if different than what is on RX):      Previously Tried and Failed:  Gabapentin ineffective  Pregabalin ineffective  Amitriptyline ineffective  Nortriptyline ineffective  Topiramate-contraindincated due to kidney stone history  She tends to run low blood pressure.   Rationale:  Chronic migriane    Insurance Name:  [employer]  Insurance ID:  [id number]       Pharmacy Information (if different than what is on RX)  Name:    Phone:

## 2022-08-08 NOTE — TELEPHONE ENCOUNTER
Prior Authorization Retail Medication Request    Medication/Dose: Rimegepant Sulfate 75 MG TBDP; Take 1 tablet by mouth daily as needed (migraine) For migraine   ICD code (if different than what is on RX):    Previously Tried and Failed:  Sumatriptan tablets (dose?) bad side effects felt like skin was coming off  Never tried other   Nurte has had one fill. She has used it 5 or 6 times  Rationale:  migraine    Insurance Name:  [employer]  Insurance ID:  [id number]       Pharmacy Information (if different than what is on RX)  Name:    Phone:

## 2022-08-08 NOTE — LETTER
8/8/2022       RE: Pita Tee  [address] [address]     Dear Colleague,    Thank you for referring your patient, Pita Tee, to the [facility] [place] [facility]. Please see a copy of my visit note below.              Pita Tee MRN# [id number]   Age: 32 year old YOB: 1990     Requesting physician: Baldemar Solano            Assessment and Plan:     (G43.719) Intractable chronic migraine without aura and without status migrainosus  Comment: The patient averages 30 days a month of headache, severe flares at least twice a month. She has a MIDAS score of 73 suggesting significant disability. In that setting focusing on preventive would be important. We will try to approve Aimovig. Patient hesitant to try Botox because her mother tried it and her whole arm was paralyzed. Discussed storage needs and how to use. Continue with Nurtec as rescue if approved by insurance I can't see PA on file here.     Plan:   1. Preventive: Start erenumab-aooe (AIMOVIG) 70 MG/ML injection monthly  2. Acute: Continue Rimegepant Sulfate 75 MG TBDP prn    60 minutes caring for the patient on the day of visit including vdieo time, documentation and chart review. Ordering of medicine and preparing for PAs.           Michaela Magallanes MD             History of Present Illness:     chief complaint:   Chief Complaint   Patient presents with     Consult     Migraine         Pita Tee is a 32 year old patient presenting for assessment of migraines.  She has had migraines for about 20 years. She has headaches every day and full blown migraines twice a month.     She describes headaches 3-4/10 right sided pain almost all the time, back of head and they can be develop into more severe migraines.  She describes the migraines as more intense and throbbing.     Rarely pain free, once every 6-12 months,  "short lasting.    She has seen neurologists in the past. Mainly in high school. She believes she tried amitriptyline, nortriptyline, gabapentin, pregabalin.     Current headache symptoms:  Frequency: daily with twice a month flares.   Quality: pressure, throbbing  Location: right side, back of head and spreads around  Duration: fairly constant  Associated symptoms: photophobia, phonophobia, sometimes she ses sparklers in front of eyes, not related to migraines (just a few seconds), recently she hallucinates smells (cigarette smoke, rotting food) notices this once a week or once every 2 weeks, very brief.  Awakens from sleep due to sx's:  Yes  Precipitating Injury:  No    Triggers: in past caffeine withdrawal.    Previous Treatment Trials:  Acute therapies:  Sumatriptan tablets (dose?) bad side effects felt like skin was coming off  Never tried other   Nurtec has had one fill. She has used it 5 or 6 times, she feels it is starting to be less effective. She has been prescribed this by her PCP, I can not see a PA.    Chronic therapies:  Gabapentin ineffective  Pregabalin ineffective  Amitriptyline ineffective  Nortriptyline ineffective  Topiramate-contraindincated due to kidney stone history  She tends to run low blood pressure.     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   Hobbies: writing, video games  No sit stand desk, she wears a posture corrector.     Walk a few miles every day and weight lifting.     Caffeine: she estimates 3-4 cups of coffee in the morning and 1-2 less caffeinated tea in the afternoon.  Water consumption: 1 liter      Sleep: \"terrible\" always has been. It is a little better since moving into a house. He feels she is a light sleeper. Any noise or light wakes her up.          Physical Exam:     Limited due to being on video  The patien tis awake and alert. No acute distress  She has normal attention and recall  No aphasia or dysarthria.   Her " face appears symmetric. She has good posture on camera, shoulder height even.              Pertinent history/data       She believes she has had an EKG. Never had CNS imaging    Mother similar migraines and headache. Maternal Grandmother.     No plans for pregnancy.          Sincerely,    Michaela Mgaallanes MD

## 2022-08-08 NOTE — PROGRESS NOTES
Pita is a 32 year old who is being evaluated via a billable video visit.      How would you like to obtain your AVS? MyChart  If the video visit is dropped, the invitation should be resent by: Text to cell phone: [phone]  Will anyone else be joining your video visit? No        Video-Visit Details    Video Start Time: 1:29PM    Type of service:  Video Visit    Video End Time:2:10 PM    Originating Location (pt. Location): Home    Distant Location (provider location):  [facility] NEUROLOGY [facility]     Platform used for Video Visit: Lelia Tee MRN# [id number]   Age: 32 year old YOB: 1990     Requesting physician: Baldemar Solano            Assessment and Plan:     (G43.719) Intractable chronic migraine without aura and without status migrainosus  Comment: The patient averages 30 days a month of headache, severe flares at least twice a month. She has a MIDAS score of 73 suggesting significant disability. In that setting focusing on preventive would be important. We will try to approve Aimovig. Patient hesitant to try Botox because her mother tried it and her whole arm was paralyzed. Discussed storage needs and how to use. Continue with Nurtec as rescue if approved by insurance I can't see PA on file here.     Plan:   1. Preventive: Start erenumab-aooe (AIMOVIG) 70 MG/ML injection monthly  2. Acute: Continue Rimegepant Sulfate 75 MG TBDP prn    60 minutes caring for the patient on the day of visit including vdieo time, documentation and chart review. Ordering of medicine and preparing for PAs.           Michaela Magallanes MD             History of Present Illness:     chief complaint:   Chief Complaint   Patient presents with     Consult     Migraine         Pita Tee is a 32 year old patient presenting for assessment of migraines.  She has had migraines for about 20 years. She has headaches every day and full blown migraines  "twice a month.     She describes headaches 3-4/10 right sided pain almost all the time, back of head and they can be develop into more severe migraines.  She describes the migraines as more intense and throbbing.     Rarely pain free, once every 6-12 months, short lasting.    She has seen neurologists in the past. Mainly in high school. She believes she tried amitriptyline, nortriptyline, gabapentin, pregabalin.     Current headache symptoms:  Frequency: daily with twice a month flares.   Quality: pressure, throbbing  Location: right side, back of head and spreads around  Duration: fairly constant  Associated symptoms: photophobia, phonophobia, sometimes she ses sparklers in front of eyes, not related to migraines (just a few seconds), recently she hallucinates smells (cigarette smoke, rotting food) notices this once a week or once every 2 weeks, very brief.  Awakens from sleep due to sx's:  Yes  Precipitating Injury:  No    Triggers: in past caffeine withdrawal.    Previous Treatment Trials:  Acute therapies:  Sumatriptan tablets (dose?) bad side effects felt like skin was coming off  Never tried other   Nurtec has had one fill. She has used it 5 or 6 times, she feels it is starting to be less effective. She has been prescribed this by her PCP, I can not see a PA.    Chronic therapies:  Gabapentin ineffective  Pregabalin ineffective  Amitriptyline ineffective  Nortriptyline ineffective  Topiramate-contraindincated due to kidney stone history  She tends to run low blood pressure.     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   Hobbies: writing, video games  No sit stand desk, she wears a posture corrector.     Walk a few miles every day and weight lifting.     Caffeine: she estimates 3-4 cups of coffee in the morning and 1-2 less caffeinated tea in the afternoon.  Water consumption: 1 liter      Sleep: \"terrible\" always has been. It is a little better since moving " into a house. He feels she is a light sleeper. Any noise or light wakes her up.          Physical Exam:     Limited due to being on video  The patien tis awake and alert. No acute distress  She has normal attention and recall  No aphasia or dysarthria.   Her face appears symmetric. She has good posture on camera, shoulder height even.              Pertinent history/data       She believes she has had an EKG. Never had CNS imaging    Mother similar migraines and headache. Maternal Grandmother.     No plans for pregnancy.

## 2022-08-08 NOTE — Clinical Note
PA needed for Aimovig. Dr Mercado had prescribed Nurtec at patient request and she likes it, she filled it once but I see no PA in file. Can we call pharmacy and see if it was approved. In my mind I thought [employer] preferred Ubrelvy

## 2022-08-08 NOTE — NURSING NOTE
Chief Complaint   Patient presents with     Consult     Migraine      Medications and allergies reviewed with patient.    Sena Jane, VVF

## 2022-08-09 NOTE — TELEPHONE ENCOUNTER
Prior Authorization Not Needed per Insurance    Medication: erenumab-aooe (AIMOVIG) 70 MG/ML injection-PA Not Needed  Insurance Company: [employer] - Phone [phone] Fax [phone]  Expected CoPay:      Pharmacy Filling the Rx: [facility] - [place], [place] - [address] [place] [address]  Pharmacy Notified: Yes  Patient Notified: No

## 2022-08-09 NOTE — TELEPHONE ENCOUNTER
Prior Authorization Not Needed per Insurance    Medication: Rimegepant Sulfate 75 MG TBDP-PA Not Needed  Insurance Company: [employer] - Phone [phone] Fax [phone]  Expected CoPay:      Pharmacy Filling the Rx: [id number] - [place], [place] - [address] AT [facility] [address]  Pharmacy Notified: Yes  Patient Notified: No

## 2022-08-15 ASSESSMENT — ENCOUNTER SYMPTOMS
DIZZINESS: 0
DISTURBANCES IN COORDINATION: 0
PARALYSIS: 0
WEAKNESS: 0
NUMBNESS: 0
HEADACHES: 1
TINGLING: 0
SEIZURES: 0
MEMORY LOSS: 0
TREMORS: 0
SPEECH CHANGE: 0
LOSS OF CONSCIOUSNESS: 0

## 2022-08-19 ENCOUNTER — OFFICE VISIT (OUTPATIENT)
Dept: FAMILY MEDICINE | Facility: CLINIC | Age: 32
End: 2022-08-19
Payer: COMMERCIAL

## 2022-08-19 ENCOUNTER — LAB (OUTPATIENT)
Dept: LAB | Facility: CLINIC | Age: 32
End: 2022-08-19
Payer: COMMERCIAL

## 2022-08-19 VITALS
RESPIRATION RATE: 18 BRPM | BODY MASS INDEX: 18.12 KG/M2 | HEART RATE: 61 BPM | OXYGEN SATURATION: 99 % | DIASTOLIC BLOOD PRESSURE: 69 MMHG | WEIGHT: 96 LBS | SYSTOLIC BLOOD PRESSURE: 104 MMHG | HEIGHT: 61 IN

## 2022-08-19 DIAGNOSIS — Z00.00 HEALTH CARE MAINTENANCE: ICD-10-CM

## 2022-08-19 DIAGNOSIS — Z00.00 HEALTH CARE MAINTENANCE: Primary | ICD-10-CM

## 2022-08-19 LAB
CHOLEST SERPL-MCNC: 127 MG/DL
FASTING STATUS PATIENT QL REPORTED: YES
FASTING STATUS PATIENT QL REPORTED: YES
GLUCOSE BLD-MCNC: 80 MG/DL (ref 70–99)
HDLC SERPL-MCNC: 76 MG/DL
LDLC SERPL CALC-MCNC: 41 MG/DL
NONHDLC SERPL-MCNC: 51 MG/DL
TRIGL SERPL-MCNC: 48 MG/DL

## 2022-08-19 PROCEDURE — 36415 COLL VENOUS BLD VENIPUNCTURE: CPT | Performed by: PATHOLOGY

## 2022-08-19 PROCEDURE — 80061 LIPID PANEL: CPT | Performed by: PATHOLOGY

## 2022-08-19 PROCEDURE — 82947 ASSAY GLUCOSE BLOOD QUANT: CPT | Performed by: PATHOLOGY

## 2022-08-19 PROCEDURE — 99395 PREV VISIT EST AGE 18-39: CPT | Performed by: FAMILY MEDICINE

## 2022-08-19 PROCEDURE — 87624 HPV HI-RISK TYP POOLED RSLT: CPT | Performed by: FAMILY MEDICINE

## 2022-08-19 PROCEDURE — G0145 SCR C/V CYTO,THINLAYER,RESCR: HCPCS | Performed by: FAMILY MEDICINE

## 2022-08-19 NOTE — PROGRESS NOTES
Assessment & Plan     Health care maintenance    - Lipid panel reflex to direct LDL Fasting; Future  - Glucose; Future  - Pap screen with HPV - recommended age 30 - 65 years    HA: per neuro, no other outstanding concerns, will update hcm issues      33 minutes spent on the date of the encounter doing chart review, history and exam, documentation and further activities per the note    Baldemar Mercado MD  [facility] [place]    Jazmyn Lema is a 32 year old, presenting for the following health issues:  Physical and Gyn Exam      HPI   Physical  Due for pap  Pt states no need std tests  Paps wnl in past  Shots utd  Labs due, on new HA regimen, too soon to say if helps  Gets routine exercise  Works online  No acute c/o  Past Medical History:   Diagnosis Date     Anemia 2006    Due to heavy periods, resolved with hormonal birth control     Dysmenorrhea      Generalised anxiety disorder     Dx age 17; on zoloft for 2 years     IBS (irritable bowel syndrome)     constipation predominant     Migraines      Nephrolithiasis      Past Surgical History:   Procedure Laterality Date     ZZC REMOVAL OF KIDNEY STONE       Current Outpatient Medications   Medication     erenumab-aooe (AIMOVIG) 70 MG/ML injection     Ibuprofen (ADVIL PO)     Rimegepant Sulfate 75 MG TBDP     No current facility-administered medications for this visit.     Allergies   Allergen Reactions     Macrobid [Nitrofurantoin]      headache       Family History   Problem Relation Age of Onset     Anxiety Disorder Mother      Neurologic Disorder Mother         Migraines     Henoch-Schonlein purpura Mother      Arthritis Sister      Asthma Sister         In childhood     Neurologic Disorder Maternal Grandmother         Possible Lewy body     Cerebrovascular Disease Maternal Grandmother         Multiple mini-strokes     Hypertension Maternal Grandfather         Heart attack followed by a-fib/flutter     C.A.D. Maternal  Grandfather         MI     Diabetes Paternal Grandfather      Hyperlipidemia Paternal Grandfather         Over 300     Breast Cancer Other      Cancer Other         Ovarian     Breast Cancer Other      Social History     Socioeconomic History     Marital status: Single     Spouse name: Not on file     Number of children: Not on file     Years of education: Not on file     Highest education level: Not on file   Occupational History     Occupation: retail clerical     Employer: [employer]   Tobacco Use     Smoking status: Never Smoker     Smokeless tobacco: Never Used   Substance and Sexual Activity     Alcohol use: Yes     Comment: social     Drug use: No     Sexual activity: Not Currently     Partners: Male     Birth control/protection: Condom, Pill   Other Topics Concern     Parent/sibling w/ CABG, MI or angioplasty before 65F 55M? 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 Every day walking    How much calcium per day? foods       How much caffeine per day? 3    How much vitamin D per day? In foods and dunlight    Do you/your family wear seatbelts?  Yes    Do you/your family use safety helmets? Yes    Do you/your family use sunscreen? Yes    Do you/your family keep firearms in the home? No    Do you/your family have a smoke detector(s)? Yes        Reviewed [facility] 8-         Social Determinants of Health     Financial Resource Strain: Not on file   Food Insecurity:  "Not on file   Transportation Needs: Not on file   Physical Activity: Not on file   Stress: Not on file   Social Connections: Not on file   Intimate Partner Violence: Not on file   Housing Stability: Not on file         Review of Systems   Answers for HPI/ROS submitted by the patient on 8/15/2022  General Symptoms: No  Skin Symptoms: No  HENT Symptoms: No  EYE SYMPTOMS: No  HEART SYMPTOMS: No  LUNG SYMPTOMS: No  INTESTINAL SYMPTOMS: No  URINARY SYMPTOMS: No  GYNECOLOGIC SYMPTOMS: No  BREAST SYMPTOMS: No  SKELETAL SYMPTOMS: No  BLOOD SYMPTOMS: No  NERVOUS SYSTEM SYMPTOMS: Yes  MENTAL HEALTH SYMPTOMS: No  Trouble with coordination: No  Dizziness or trouble with balance: No  Fainting or black-out spells: No  Memory loss: No  Headache: Yes  Seizures: No  Speech problems: No  Tingling: No  Tremor: No  Weakness: No  Difficulty walking: No  Paralysis: No  Numbness: No          Objective    /69   Pulse 61   Resp 18   Ht 1.549 m (5' 1\")   Wt 43.5 kg (96 lb)   LMP 07/25/2022   SpO2 99%   BMI 18.14 kg/m    Body mass index is 18.14 kg/m .  Physical Exam   GENERAL: healthy, alert and no distress  ENT clear  NECK: no adenopathy, no asymmetry, masses, or scars and thyroid normal to palpation  RESP: lungs clear to auscultation - no rales, rhonchi or wheezes  CV: regular rate and rhythm, normal S1 S2, no S3 or S4, no murmur, click or rub, no peripheral edema and peripheral pulses strong  ABDOMEN: soft, nontender, no hepatosplenomegaly, no masses and bowel sounds normal   (female): normal female external genitalia, normal urethral meatus, vaginal mucosa, normal cervix/adnexa/uterus without masses or discharge  MS: no gross musculoskeletal defects noted, no edema          .  ..    "

## 2022-08-19 NOTE — NURSING NOTE
Pita Tee is a 32 year old female patient that presents today in clinic for the following:    Chief Complaint   Patient presents with     Physical     Gyn Exam     The patient's allergies and medications were reviewed as noted. A set of vitals were recorded as noted without incident. The patient does not have any other questions for the provider.    Niru Anderson, EMT at 1:01 PM on 8/19/2022

## 2022-08-23 LAB
BKR LAB AP GYN ADEQUACY: NORMAL
BKR LAB AP GYN INTERPRETATION: NORMAL
BKR LAB AP HPV REFLEX: NORMAL
BKR LAB AP LMP: NORMAL
BKR LAB AP PREVIOUS ABNORMAL: NORMAL
PATH REPORT.COMMENTS IMP SPEC: NORMAL
PATH REPORT.COMMENTS IMP SPEC: NORMAL
PATH REPORT.RELEVANT HX SPEC: NORMAL

## 2022-08-25 LAB
HUMAN PAPILLOMA VIRUS 16 DNA: NEGATIVE
HUMAN PAPILLOMA VIRUS 18 DNA: NEGATIVE
HUMAN PAPILLOMA VIRUS FINAL DIAGNOSIS: NORMAL
HUMAN PAPILLOMA VIRUS OTHER HR: NEGATIVE

## 2022-09-07 ENCOUNTER — TELEPHONE (OUTPATIENT)
Dept: NEUROLOGY | Facility: CLINIC | Age: 32
End: 2022-09-07

## 2022-09-07 DIAGNOSIS — G43.719 INTRACTABLE CHRONIC MIGRAINE WITHOUT AURA AND WITHOUT STATUS MIGRAINOSUS: ICD-10-CM

## 2022-09-07 RX ORDER — ERENUMAB-AOOE 70 MG/ML
70 INJECTION SUBCUTANEOUS
Qty: 3 ML | Refills: 4 | Status: SHIPPED | OUTPATIENT
Start: 2022-09-07 | End: 2023-08-08

## 2022-09-07 NOTE — TELEPHONE ENCOUNTER
Health Call Center    Phone Message    May a detailed message be left on voicemail: yes     Reason for Call: Other: Patient has been trying to reach Dr. Sona be. Please see [employer] message. Patient needs to order this by today.    Action Taken: Message routed to:  [facility]y    Travel Screening: Not Applicable

## 2022-09-07 NOTE — TELEPHONE ENCOUNTER
I called pt to discuss message. She confirmed she would like her aimovig ordered as a 3 month supply. Per the pharmacy if we send order for 3 month supply she will pay the same copay for 3 months as 1 month. I will send new order to Dr. Magallanes to sign.     Vivi GOMEZ

## 2022-09-07 NOTE — TELEPHONE ENCOUNTER
I called pt to let her know to make sure to refrigerate her aimovig until ready to use. She verbalized understanding.     Vivi GOMEZ

## 2023-01-14 ENCOUNTER — HEALTH MAINTENANCE LETTER (OUTPATIENT)
Age: 33
End: 2023-01-14

## 2023-01-31 ENCOUNTER — MYC MEDICAL ADVICE (OUTPATIENT)
Dept: FAMILY MEDICINE | Facility: CLINIC | Age: 33
End: 2023-01-31
Payer: COMMERCIAL

## 2023-01-31 DIAGNOSIS — G47.00 INSOMNIA: Primary | ICD-10-CM

## 2023-01-31 RX ORDER — TRAZODONE HYDROCHLORIDE 50 MG/1
25-50 TABLET, FILM COATED ORAL
Qty: 30 TABLET | Refills: 1 | Status: SHIPPED | OUTPATIENT
Start: 2023-01-31 | End: 2024-01-10

## 2023-01-31 NOTE — TELEPHONE ENCOUNTER
Ok trazodone  50 mg pill  Take 1/2 to 1 full pill po at bedtime prn insomnia  Ok 30 one refill  If not helpful let us know  Watch for excess tiredness, or dizziness

## 2023-08-08 ENCOUNTER — LAB (OUTPATIENT)
Dept: LAB | Facility: CLINIC | Age: 33
End: 2023-08-08
Payer: COMMERCIAL

## 2023-08-08 ENCOUNTER — OFFICE VISIT (OUTPATIENT)
Dept: FAMILY MEDICINE | Facility: CLINIC | Age: 33
End: 2023-08-08
Payer: COMMERCIAL

## 2023-08-08 VITALS
DIASTOLIC BLOOD PRESSURE: 66 MMHG | SYSTOLIC BLOOD PRESSURE: 101 MMHG | OXYGEN SATURATION: 98 % | HEART RATE: 66 BPM | WEIGHT: 99.4 LBS | BODY MASS INDEX: 18.77 KG/M2 | HEIGHT: 61 IN

## 2023-08-08 DIAGNOSIS — Z00.00 HEALTH CARE MAINTENANCE: ICD-10-CM

## 2023-08-08 DIAGNOSIS — Z00.00 HEALTH CARE MAINTENANCE: Primary | ICD-10-CM

## 2023-08-08 LAB
CHOLEST SERPL-MCNC: 163 MG/DL
FASTING STATUS PATIENT QL REPORTED: YES
GLUCOSE SERPL-MCNC: 90 MG/DL (ref 70–99)
HDLC SERPL-MCNC: 90 MG/DL
LDLC SERPL CALC-MCNC: 64 MG/DL
NONHDLC SERPL-MCNC: 73 MG/DL
TRIGL SERPL-MCNC: 43 MG/DL

## 2023-08-08 PROCEDURE — 99395 PREV VISIT EST AGE 18-39: CPT | Performed by: FAMILY MEDICINE

## 2023-08-08 PROCEDURE — 80061 LIPID PANEL: CPT | Performed by: PATHOLOGY

## 2023-08-08 PROCEDURE — 82947 ASSAY GLUCOSE BLOOD QUANT: CPT | Performed by: PATHOLOGY

## 2023-08-08 PROCEDURE — 36415 COLL VENOUS BLD VENIPUNCTURE: CPT | Performed by: PATHOLOGY

## 2023-08-08 NOTE — NURSING NOTE
"Pita Tee is a 33 year old female patient that presents today in clinic for the following:    Chief Complaint   Patient presents with    Physical     The patient's allergies and medications were reviewed as noted. A set of vitals were recorded as noted without incident: /66 (BP Location: Right arm, Patient Position: Sitting, Cuff Size: Adult Regular)   Pulse 66   Ht 1.559 m (5' 1.38\")   Wt 45.1 kg (99 lb 6.4 oz)   LMP 07/25/2023 (Approximate)   SpO2 98%   BMI 18.55 kg/m  . The patient does not have any other questions for the provider.    Kita Gardner, EMT at 9:32 AM on 8/8/2023  "

## 2023-08-09 NOTE — PROGRESS NOTES
Assessment & Plan     Health care maintenance    - Lipid panel reflex to direct LDL Fasting; Future  - Glucose; Future    She'll be in touch with Neurology re: possible botox      35 minutes spent by me on the date of the encounter doing chart review, history and exam, documentation and further activities per the note    No follow-ups on file.    Baldemar Mercado MD  [facility] [place]    Jazmyn Lema is a 33 year old, presenting for the following health issues:  Physical    HPI Here for physical  Due for labs routinely  Not due for pap this year  Sees HA clinic she plans to communicate w/ Neurology should she consider Botox they've discussed in past, as HA are still frequent   No acute c/o  Past Medical History:   Diagnosis Date    Anemia 2006    Due to heavy periods, resolved with hormonal birth control    Dysmenorrhea     Generalised anxiety disorder     Dx age 17; on zoloft for 2 years    IBS (irritable bowel syndrome)     constipation predominant    Migraines     Nephrolithiasis      Past Surgical History:   Procedure Laterality Date    ZZC REMOVAL OF KIDNEY STONE       Current Outpatient Medications   Medication    Ibuprofen (ADVIL PO)    Rimegepant Sulfate 75 MG TBDP    traZODone (DESYREL) 50 MG tablet     No current facility-administered medications for this visit.     Allergies   Allergen Reactions    Macrobid [Nitrofurantoin]      headache       Social History     Socioeconomic History    Marital status: Single     Spouse name: Not on file    Number of children: Not on file    Years of education: Not on file    Highest education level: Not on file   Occupational History    Occupation: retail clerical     Employer: [employer]   Tobacco Use    Smoking status: Never    Smokeless tobacco: Never   Substance and Sexual Activity    Alcohol use: Yes     Comment: social-1 glass of wine per week    Drug use: No    Sexual activity: Not Currently     Partners: Male     Birth  control/protection: Condom, Pill   Other Topics Concern    Parent/sibling w/ CABG, MI or angioplasty before 65F 55M? 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 Every day walking    How much calcium per day? foods       How much caffeine per day? 3    How much vitamin D per day? In foods and dunlight    Do you/your family wear seatbelts?  Yes    Do you/your family use safety helmets? Yes    Do you/your family use sunscreen? Yes    Do you/your family keep firearms in the home? No    Do you/your family have a smoke detector(s)? Yes        [facility]         Social Determinants of Health     Financial Resource Strain: Not on file   Food Insecurity: Not on file   Transportation Needs: Not on file   Physical Activity: Not on file   Stress: Not on file   Social Connections: Not on file   Intimate Partner Violence: Not on file   Housing Stability: Not on file     Family History   Problem Relation Age of Onset    Anxiety Disorder Mother     Neurologic Disorder Mother         Migraines    Henoch-Schonlein purpura Mother     Arthritis Sister     Asthma Sister         In childhood    Neurologic Disorder Maternal Grandmother         Possible Lewy body    Cerebrovascular Disease Maternal Grandmother         Multiple mini-strokes    Hypertension Maternal Grandfather         Heart attack followed by a-fib/flutter    C.A.D. Maternal Grandfather         MI    Diabetes Paternal Grandfather  "    Hyperlipidemia Paternal Grandfather         Over 300    Breast Cancer Other     Cancer Other         Ovarian    Breast Cancer Other                Review of Systems   Ten point ROS otherwise negative      Objective    /66 (BP Location: Right arm, Patient Position: Sitting, Cuff Size: Adult Regular)   Pulse 66   Ht 1.559 m (5' 1.38\")   Wt 45.1 kg (99 lb 6.4 oz)   LMP 07/25/2023 (Approximate)   SpO2 98%   BMI 18.55 kg/m    Body mass index is 18.55 kg/m .  Physical Exam   GENERAL: healthy, alert and no distress  EYES: Eyes grossly normal to inspection, PERRL and conjunctivae and sclerae normal  HENT: ear canals and TM's normal, nose and mouth without ulcers or lesions  NECK: no adenopathy, no asymmetry, masses, or scars and thyroid normal to palpation  RESP: lungs clear to auscultation - no rales, rhonchi or wheezes  BREAST: normal without masses, tenderness or nipple discharge and no palpable axillary masses or adenopathy  CV: regular rate and rhythm, normal S1 S2, no S3 or S4, no murmur, click or rub, no peripheral edema and peripheral pulses strong  ABDOMEN: soft, nontender, no hepatosplenomegaly, no masses and bowel sounds normal  MS: no gross musculoskeletal defects noted, no edema  SKIN: no suspicious lesions or rashes  NEURO: Normal strength and tone, mentation intact and speech normal  PSYCH: mentation appears normal, affect normal/bright                  "

## 2023-10-05 ENCOUNTER — MYC MEDICAL ADVICE (OUTPATIENT)
Dept: FAMILY MEDICINE | Facility: CLINIC | Age: 33
End: 2023-10-05
Payer: COMMERCIAL

## 2024-01-10 ENCOUNTER — NURSE TRIAGE (OUTPATIENT)
Dept: NURSING | Facility: CLINIC | Age: 34
End: 2024-01-10

## 2024-01-10 ENCOUNTER — VIRTUAL VISIT (OUTPATIENT)
Dept: FAMILY MEDICINE | Facility: CLINIC | Age: 34
End: 2024-01-10
Payer: COMMERCIAL

## 2024-01-10 DIAGNOSIS — J06.9 URI WITH COUGH AND CONGESTION: Primary | ICD-10-CM

## 2024-01-10 PROCEDURE — 99213 OFFICE O/P EST LOW 20 MIN: CPT | Mod: 95 | Performed by: NURSE PRACTITIONER

## 2024-01-10 ASSESSMENT — ENCOUNTER SYMPTOMS: COUGH: 1

## 2024-01-10 NOTE — TELEPHONE ENCOUNTER
Nurse Triage SBAR    Is this a 2nd Level Triage? NO    Situation: Cough    Background: Coughing for 3 weeks with intermittent fever. Temperatures usually under 100F with ear thermometer.     Assessment: Coughing spasms with intermittent low grade fever. Denies difficulty breathing or wheezing.     Protocol Recommended Disposition:   See in Office Today    Recommendation: See if office today. Patient transferred to  to get an appointment.     Reason for Disposition   Fever present > 3 days (72 hours)    Additional Information   Negative: Bluish (or gray) lips or face   Negative: SEVERE difficulty breathing (e.g., struggling for each breath, speaks in single words)   Negative: Rapid onset of cough and has hives   Negative: Coughing started suddenly after medicine, an allergic food or bee sting   Negative: Difficulty breathing after exposure to flames, smoke, or fumes   Negative: Sounds like a life-threatening emergency to the triager   Negative: MODERATE difficulty breathing (e.g., speaks in phrases, SOB even at rest, pulse 100-120) and still present when not coughing   Negative: Chest pain present when not coughing   Negative: Passed out (i.e., fainted, collapsed and was not responding)   Negative: Patient sounds very sick or weak to the triager   Negative: MILD difficulty breathing (e.g., minimal/no SOB at rest, SOB with walking, pulse <100) and still present when not coughing   Negative: Coughed up > 1 tablespoon (15 ml) blood (Exception: Blood-tinged sputum.)   Negative: Fever > 103 F (39.4 C)   Negative: Fever > 101 F (38.3 C) and over 60 years of age   Negative: Fever > 100.0 F (37.8 C) and has diabetes mellitus or a weak immune system (e.g., HIV positive, cancer chemotherapy, organ transplant, splenectomy, chronic steroids)   Negative: Fever > 100.0 F (37.8 C) and bedridden (e.g., CVA, chronic illness, recovering from surgery)   Negative: Increasing ankle swelling   Negative: Wheezing is present    Negative: SEVERE coughing spells (e.g., whooping sound after coughing, vomiting after coughing)   Negative: Coughing up sina-colored (reddish-brown) or blood-tinged sputum    Protocols used: Cough-A-OH

## 2024-01-10 NOTE — PROGRESS NOTES
Pita is a 33 year old who is being evaluated via a billable video visit.      How would you like to obtain your AVS? MyChart  If the video visit is dropped, the invitation should be resent by: Text to cell phone: [phone]  Will anyone else be joining your video visit? No          Assessment & Plan     URI with cough and congestion  URI for 2.5 weeks-new onset of fevers over the last 2 days.  Will treat with antibiotic.  Educated on use and possible side effects.  Reviewed treatment plan.   Reviewed fever and pain control with tylenol and/or ibuprofen  Instructed to call or return for:  --Symptoms not improved in the next 3 days  --Worsening symptom  --New unexplained symptoms develop    - amoxicillin-clavulanate (AUGMENTIN) 875-125 MG tablet; Take 1 tablet by mouth 2 times daily       MARTHA Ovalles CNP  [facility] STORMY Lema is a 33 year old, presenting for the following health issues:  Cough      Cough    History of Present Illness       Reason for visit:  Suspected bronchitis  Symptom onset:  3-4 weeks ago  Symptoms include:  Persistent dry cough, intermittent fever around 100 in ear  Symptom intensity:  Moderate  Symptom progression:  Worsening  Had these symptoms before:  Yes  Has tried/received treatment for these symptoms:  Yes  Previous treatment was successful:  Yes  Prior treatment description:  Steroid inhaler for bronchitis in college, but that was a worse case  What makes it worse:  Talking a lot, cold air, activity  What makes it better:  Warm drinks, hot showers    She eats 2-3 servings of fruits and vegetables daily.She consumes 0 sweetened beverage(s) daily.She exercises with enough effort to increase her heart rate 30 to 60 minutes per day.  She exercises with enough effort to increase her heart rate 7 days per week.   She is taking medications regularly.         Video visit converted to phone call due to connectivity issues    Has not been feeling  well for the last 2.5 weeks. Dry cough. No wheezing or shortness of breath. Temp up 99.8. Has had a bit of congestion here and there. No other ill contacts that is aware of. Has tested for COVID a couple of times and was negative. Does not really feel that bad. Has humidifer. Advil occasionally. Drinking hot drinks. No increased fatigue. Feels like is worse over the last 2.5 weeks. Coughing so hard hard that feels like could gag. No wheezing.     Review of Systems   Respiratory:  Positive for cough.           Objective           Vitals:  No vitals were obtained today due to virtual visit.    Physical Exam   Speaking in full sentences.    No audible wheezing.  Pleasant.        Phone call visit: 7 minutes      Originating Location (pt. Location): Home    Distant Location (provider location):  On-site

## 2024-01-14 ENCOUNTER — NURSE TRIAGE (OUTPATIENT)
Dept: NURSING | Facility: CLINIC | Age: 34
End: 2024-01-14
Payer: COMMERCIAL

## 2024-01-15 ENCOUNTER — E-VISIT (OUTPATIENT)
Dept: URGENT CARE | Facility: CLINIC | Age: 34
End: 2024-01-15
Payer: COMMERCIAL

## 2024-01-15 ENCOUNTER — OFFICE VISIT (OUTPATIENT)
Dept: FAMILY MEDICINE | Facility: CLINIC | Age: 34
End: 2024-01-15
Payer: COMMERCIAL

## 2024-01-15 VITALS
TEMPERATURE: 98 F | OXYGEN SATURATION: 98 % | SYSTOLIC BLOOD PRESSURE: 97 MMHG | DIASTOLIC BLOOD PRESSURE: 64 MMHG | RESPIRATION RATE: 14 BRPM | HEART RATE: 70 BPM

## 2024-01-15 DIAGNOSIS — R05.1 ACUTE COUGH: Primary | ICD-10-CM

## 2024-01-15 DIAGNOSIS — T88.7XXA MEDICATION SIDE EFFECTS: ICD-10-CM

## 2024-01-15 DIAGNOSIS — J06.9 UPPER RESPIRATORY TRACT INFECTION, UNSPECIFIED TYPE: Primary | ICD-10-CM

## 2024-01-15 PROBLEM — M25.542 JOINT PAIN IN FINGERS OF BOTH HANDS: Status: RESOLVED | Noted: 2021-05-14 | Resolved: 2024-01-15

## 2024-01-15 PROBLEM — M25.541 JOINT PAIN IN FINGERS OF BOTH HANDS: Status: RESOLVED | Noted: 2021-05-14 | Resolved: 2024-01-15

## 2024-01-15 PROCEDURE — 99207 PR NON-BILLABLE SERV PER CHARTING: CPT | Performed by: FAMILY MEDICINE

## 2024-01-15 PROCEDURE — 99213 OFFICE O/P EST LOW 20 MIN: CPT | Performed by: PHYSICIAN ASSISTANT

## 2024-01-15 RX ORDER — DOXYCYCLINE 100 MG/1
100 CAPSULE ORAL 2 TIMES DAILY
Qty: 14 CAPSULE | Refills: 0 | Status: SHIPPED | OUTPATIENT
Start: 2024-01-15 | End: 2024-01-22

## 2024-01-15 NOTE — PATIENT INSTRUCTIONS
Dear Pita Tee,    We are sorry you are not feeling well. Based on the responses you provided, it is recommended that you be seen in-person in urgent care so we can better evaluate your symptoms. Please click here to find the nearest urgent care location to you.   You will not be charged for this Visit. Thank you for trusting us with your care.    MARTHA GONZALEZ CNP

## 2024-01-23 ENCOUNTER — TELEPHONE (OUTPATIENT)
Dept: FAMILY MEDICINE | Facility: CLINIC | Age: 34
End: 2024-01-23

## 2024-01-23 ENCOUNTER — OFFICE VISIT (OUTPATIENT)
Dept: FAMILY MEDICINE | Facility: CLINIC | Age: 34
End: 2024-01-23
Payer: COMMERCIAL

## 2024-01-23 VITALS
DIASTOLIC BLOOD PRESSURE: 67 MMHG | RESPIRATION RATE: 16 BRPM | HEART RATE: 63 BPM | SYSTOLIC BLOOD PRESSURE: 100 MMHG | OXYGEN SATURATION: 98 % | TEMPERATURE: 97.8 F

## 2024-01-23 DIAGNOSIS — R07.0 THROAT PAIN: Primary | ICD-10-CM

## 2024-01-23 LAB
DEPRECATED S PYO AG THROAT QL EIA: NEGATIVE
FLUAV AG SPEC QL IA: NEGATIVE
FLUBV AG SPEC QL IA: NEGATIVE
GROUP A STREP BY PCR: NOT DETECTED
SARS-COV-2 RNA RESP QL NAA+PROBE: NEGATIVE

## 2024-01-23 PROCEDURE — 99213 OFFICE O/P EST LOW 20 MIN: CPT | Performed by: PHYSICIAN ASSISTANT

## 2024-01-23 PROCEDURE — 87804 INFLUENZA ASSAY W/OPTIC: CPT | Performed by: PHYSICIAN ASSISTANT

## 2024-01-23 PROCEDURE — 87635 SARS-COV-2 COVID-19 AMP PRB: CPT | Performed by: PHYSICIAN ASSISTANT

## 2024-01-23 PROCEDURE — 87651 STREP A DNA AMP PROBE: CPT | Performed by: PHYSICIAN ASSISTANT

## 2024-01-23 NOTE — TELEPHONE ENCOUNTER
Pt went to urgent care and had several labs done. Influenza/strep A rapid tests were negative and still await for the final result of covid and GAS. She does not have fever, but severe sore throat. She finished 7 day course of doxycycline yesterday. I recommend home remedies of salt water gargling, plenty of fluids and rest. I asked to send us MC message tomorrow and I will follow up with her.

## 2024-01-23 NOTE — TELEPHONE ENCOUNTER
[facility] Call Center    Phone Message    May a detailed message be left on voicemail: yes     Reason for Call: Symptoms or Concerns     If patient has red-flag symptoms, warm transfer to triage line    Current symptom or concern: throat pain, hurts a lot to swallow, being treated for bronchitis, that is not gone, and the pain started after starting Doxycycline, no appts till 1/30/24    Symptoms have been present for:  3 day(s)    Has patient previously been seen for this? No    Are there any new or worsening symptoms? Yes: The pain is not getting better and is getting worse, please call.      Action Taken: Message routed to:  [facility]    Travel Screening: Not Applicable

## 2024-01-23 NOTE — PROGRESS NOTES
Assessment & Plan:      Problem List Items Addressed This Visit    None  Visit Diagnoses       Throat pain    -  Primary    Relevant Orders    Symptomatic COVID-19 Virus (Coronavirus) by PCR Nose (Completed)    Influenza A & B Antigen - Clinic Collect (Completed)    Streptococcus A Rapid Screen w/Reflex to PCR - Clinic Collect (Completed)    Group A Streptococcus PCR Throat Swab (Completed)          Medical Decision Making  Patient developing new onset throat pain and cough after subacute cough for 1 month with 2 rounds of antibiotics.  Symptoms appear consistent with a new viral pharyngitis.  Low concern for pill esophagitis versus other side effect of the antibiotics.  Viral testing and rapid strep are negative at this time.  Continue with conservative measures for suspected new viral respiratory infection.  Discussed treatment and symptomatic care.  Allergies and medication interactions reviewed.  Discussed signs of worsening symptoms and when to follow-up with PCP if no symptom improvement.     Subjective:      Pita Tee is a 34 year old female here for evaluation of sore throat and worsening cough.  Patient has had ongoing cough for 1 month.  She then did a virtual visit on 1/10 and was started on oral Augmentin for diagnosis of upper respiratory infection for 2 and half weeks.  She took this for a few days and developed severe diarrhea.  She was then seen in the walk-in care clinic and started on oral doxycycline.  Patient did take the antibiotics for the full 7 days with last dose of medication being yesterday.  Patient then developed severe sore throat overnight.  She is wondering if this is a reaction to the medication.     The following portions of the patient's history were reviewed and updated as appropriate: allergies, current medications, and problem list.     Review of Systems  Pertinent items are noted in HPI.    Allergies  Allergies   Allergen Reactions    Macrobid [Nitrofurantoin]       headache         Family History   Problem Relation Age of Onset    Anxiety Disorder Mother     Neurologic Disorder Mother         Migraines    Henoch-Schonlein purpura Mother     Arthritis Sister     Asthma Sister         In childhood    Neurologic Disorder Maternal Grandmother         Possible Lewy body    Cerebrovascular Disease Maternal Grandmother         Multiple mini-strokes    Hypertension Maternal Grandfather         Heart attack followed by a-fib/flutter    C.A.D. Maternal Grandfather         MI    Diabetes Paternal Grandfather     Hyperlipidemia Paternal Grandfather         Over 300    Breast Cancer Other     Cancer Other         Ovarian    Breast Cancer Other        Social History     Tobacco Use    Smoking status: Every Day     Types: Other    Smokeless tobacco: Never    Tobacco comments:     Medical Marijuana   Substance Use Topics    Alcohol use: Yes     Comment: social-1 glass of wine per week        Objective:      /67   Pulse 63   Temp 97.8  F (36.6  C)   Resp 16   SpO2 98%   General appearance - alert, well appearing, and in no distress and non-toxic  Mouth - posterior pharynx is mildly erythematous, no tonsillar swelling or exudate  Neck - moderate bilateral anterior cervical lymphadenopathy  Chest - clear to auscultation, no wheezes, rales or rhonchi, symmetric air entry  Heart - normal rate, regular rhythm, normal S1, S2, no murmurs, rubs, clicks or gallops     Lab & Imaging Results    Results for orders placed or performed in visit on 01/23/24   Symptomatic COVID-19 Virus (Coronavirus) by PCR Nose     Status: Normal    Specimen: Nose; Swab   Result Value Ref Range    SARS CoV2 PCR Negative Negative    Narrative    Testing was performed using the Aptima SARS-CoV-2 Assay on the  [employer] Instrument System. Additional information about this  Emergency Use Authorization (EUA) assay can be found via the Lab  Guide. This test should be ordered for the detection of SARS-CoV-2 in  individuals  who meet SARS-CoV-2 clinical and/or epidemiological  criteria. Test performance is unknown in asymptomatic patients. This  test is for in vitro diagnostic use under the FDA EUA for  laboratories certified under CLIA to perform high complexity testing.  This test has not been FDA cleared or approved. A negative result  does not rule out the presence of PCR inhibitors in the specimen or  target RNA in concentration below the limit of detection for the  assay. The possibility of a false negative should be considered if  the patient's recent exposure or clinical presentation suggests  COVID-19. This test was validated by the [facility]. This laboratory is certified under  the Clinical Laboratory Improvement Amendments of 1988 (CLIA-88) as  qualified to perform high complexity laboratory testing.   Influenza A & B Antigen - Clinic Collect     Status: Normal    Specimen: Nose; Swab   Result Value Ref Range    Influenza A antigen Negative Negative    Influenza B antigen Negative Negative    Narrative    Test results must be correlated with clinical data. If necessary, results should be confirmed by a molecular assay or viral culture.   Streptococcus A Rapid Screen w/Reflex to PCR - Clinic Collect     Status: Normal    Specimen: Throat; Swab   Result Value Ref Range    Group A Strep antigen Negative Negative   Group A Streptococcus PCR Throat Swab     Status: Normal    Specimen: Throat; Swab   Result Value Ref Range    Group A strep by PCR Not Detected Not Detected    Narrative    The Xpert Xpress Strep A test, performed on the [employer], is a rapid, qualitative in vitro diagnostic test for the detection of Streptococcus pyogenes (Group A ß-hemolytic Streptococcus, Strep A) in throat swab specimens from patients with signs and symptoms of pharyngitis. The Xpert Xpress Strep A test can be used as an aid in the diagnosis of Group A Streptococcal  pharyngitis. The assay is not intended to monitor treatment for Group A Streptococcus infections. The Xpert Xpress Strep A test utilizes an automated real-time polymerase chain reaction (PCR) to detect Streptococcus pyogenes DNA.       I personally reviewed these results and discussed findings with the patient.    The use of Dragon/PowerMic dictation services was used to construct the content of this note; any grammatical errors are non-intentional. Please contact the author directly if you are in need of any clarification.

## 2024-08-06 SDOH — HEALTH STABILITY: PHYSICAL HEALTH: ON AVERAGE, HOW MANY MINUTES DO YOU ENGAGE IN EXERCISE AT THIS LEVEL?: 90 MIN

## 2024-08-06 SDOH — HEALTH STABILITY: PHYSICAL HEALTH: ON AVERAGE, HOW MANY DAYS PER WEEK DO YOU ENGAGE IN MODERATE TO STRENUOUS EXERCISE (LIKE A BRISK WALK)?: 7 DAYS

## 2024-08-06 ASSESSMENT — SOCIAL DETERMINANTS OF HEALTH (SDOH): HOW OFTEN DO YOU GET TOGETHER WITH FRIENDS OR RELATIVES?: ONCE A WEEK

## 2024-08-09 ENCOUNTER — LAB (OUTPATIENT)
Dept: LAB | Facility: CLINIC | Age: 34
End: 2024-08-09
Payer: COMMERCIAL

## 2024-08-09 ENCOUNTER — OFFICE VISIT (OUTPATIENT)
Dept: FAMILY MEDICINE | Facility: CLINIC | Age: 34
End: 2024-08-09
Payer: COMMERCIAL

## 2024-08-09 VITALS
HEIGHT: 61 IN | WEIGHT: 97.8 LBS | DIASTOLIC BLOOD PRESSURE: 68 MMHG | RESPIRATION RATE: 14 BRPM | OXYGEN SATURATION: 100 % | BODY MASS INDEX: 18.46 KG/M2 | SYSTOLIC BLOOD PRESSURE: 103 MMHG | HEART RATE: 57 BPM

## 2024-08-09 DIAGNOSIS — Z11.4 SCREENING FOR HIV (HUMAN IMMUNODEFICIENCY VIRUS): Primary | ICD-10-CM

## 2024-08-09 DIAGNOSIS — Z00.00 HEALTH CARE MAINTENANCE: ICD-10-CM

## 2024-08-09 DIAGNOSIS — Z11.4 SCREENING FOR HIV (HUMAN IMMUNODEFICIENCY VIRUS): ICD-10-CM

## 2024-08-09 DIAGNOSIS — Z11.59 NEED FOR HEPATITIS C SCREENING TEST: ICD-10-CM

## 2024-08-09 DIAGNOSIS — Z00.00 ROUTINE GENERAL MEDICAL EXAMINATION AT A HEALTH CARE FACILITY: ICD-10-CM

## 2024-08-09 LAB
CHOLEST SERPL-MCNC: 134 MG/DL
FASTING STATUS PATIENT QL REPORTED: YES
HBA1C MFR BLD: 5.2 %
HCV AB SERPL QL IA: NONREACTIVE
HDLC SERPL-MCNC: 79 MG/DL
HIV 1+2 AB+HIV1 P24 AG SERPL QL IA: NONREACTIVE
LDLC SERPL CALC-MCNC: 45 MG/DL
NONHDLC SERPL-MCNC: 55 MG/DL
TRIGL SERPL-MCNC: 49 MG/DL

## 2024-08-09 PROCEDURE — 87389 HIV-1 AG W/HIV-1&-2 AB AG IA: CPT | Performed by: FAMILY MEDICINE

## 2024-08-09 PROCEDURE — 80061 LIPID PANEL: CPT | Performed by: PATHOLOGY

## 2024-08-09 PROCEDURE — 36415 COLL VENOUS BLD VENIPUNCTURE: CPT | Performed by: PATHOLOGY

## 2024-08-09 PROCEDURE — 86803 HEPATITIS C AB TEST: CPT | Performed by: FAMILY MEDICINE

## 2024-08-09 PROCEDURE — 83036 HEMOGLOBIN GLYCOSYLATED A1C: CPT | Performed by: FAMILY MEDICINE

## 2024-08-09 PROCEDURE — 99395 PREV VISIT EST AGE 18-39: CPT | Performed by: FAMILY MEDICINE

## 2024-08-09 PROCEDURE — 99000 SPECIMEN HANDLING OFFICE-LAB: CPT | Performed by: PATHOLOGY

## 2024-08-09 NOTE — PROGRESS NOTES
Preventive Care Visit  [facility]  Baldemar Mercado MD, Family Medicine  Aug 9, 2024      Assessment & Plan     Screening for HIV (human immunodeficiency virus)  Discussed  - HIV Screening; Future    Need for hepatitis C screening test  Same  - Hepatitis C Screen Reflex to HCV RNA Quant and Genotype; Future    Health care maintenance  Due  - Lipid panel reflex to direct LDL Fasting; Future  - Hemoglobin A1c; Future    Routine general medical examination at a health care facility  Done  Migraines: we redid med cannabis, helps somewhat, tolerated, she works w/ Neurology            Nicotine/Tobacco Cessation  She reports that she has been smoking other. She has never used smokeless tobacco.  Nicotine/Tobacco Cessation Plan  Not using tobacco      Counseling  Appropriate preventive services were addressed with this patient via screening, questionnaire, or discussion as appropriate for fall prevention, nutrition, physical activity, Tobacco-use cessation, weight loss and cognition.  Checklist reviewing preventive services available has been given to the patient.  Reviewed patient's diet, addressing concerns and/or questions.       Return in about 53 weeks (around 8/15/2025) for Annual Wellness Visit.    Jazmyn Lema is a 34 year old, presenting for the following:  Physical, Neurologic Problem (Facial twitch, unsure of when it started, at least a few weeks, primarily in the morning), and Black mold        8/9/2024     7:23 AM   Additional Questions   Roomed by mary LE  See form for work from home due to severe HA/light triggers  Stress: significant leaky bsmt  Labs due  No shots or pap due  No significant interval health issues  Past Medical History:   Diagnosis Date    Anemia 2006    Due to heavy periods, resolved with hormonal birth control    Dysmenorrhea     Generalised anxiety disorder     Dx age 17; on zoloft for 2 years    IBS (irritable bowel  syndrome)     constipation predominant    Migraines     Nephrolithiasis      Past Surgical History:   Procedure Laterality Date    ZZC REMOVAL OF KIDNEY STONE       Current Outpatient Medications   Medication Sig Dispense Refill    Ibuprofen (ADVIL PO) Take by mouth as needed for moderate pain      medical cannabis (Patient's own supply) (The purpose of this order is to document that the patient reports taking medical cannabis.  This is not a prescription, and is not used to certify that the patient has a qualifying medical condition.)       No current facility-administered medications for this visit.     Allergies   Allergen Reactions    Macrobid [Nitrofurantoin]      headache       Family History   Problem Relation Age of Onset    Anxiety Disorder Mother     Neurologic Disorder Mother         Migraines    Henoch-Schonlein purpura Mother     Hyperlipidemia Father         Borderline, on a statin    Arthritis Sister     Asthma Sister         In childhood    Neurologic Disorder Maternal Grandmother         Possible Lewy body    Cerebrovascular Disease Maternal Grandmother         Multiple mini-strokes    Hypertension Maternal Grandfather         Heart attack followed by a-fib/flutter    C.A.D. Maternal Grandfather         MI    Diabetes Paternal Grandfather     Hyperlipidemia Paternal Grandfather         Over 300    Breast Cancer Other     Cancer Other         Ovarian    Breast Cancer Other      Social History     Socioeconomic History    Marital status: Single     Spouse name: Not on file    Number of children: Not on file    Years of education: Not on file    Highest education level: Not on file   Occupational History    Occupation: retail clerical     Employer: [employer]   Tobacco Use    Smoking status: Every Day     Types: Other    Smokeless tobacco: Never    Tobacco comments:     Medical Marijuana   Vaping Use    Vaping status: Never Used   Substance and Sexual Activity    Alcohol use: Yes     Comment: social-1  glass of wine per week    Drug use: Yes     Types: Marijuana    Sexual activity: Not Currently     Partners: Male     Birth control/protection: Condom   Other Topics Concern    Parent/sibling w/ CABG, MI or angioplasty before 65F 55M? 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 Every day walking    How much calcium per day? foods       How much caffeine per day? 3    How much vitamin D per day? In foods and dunlight    Do you/your family wear seatbelts?  Yes    Do you/your family use safety helmets? Yes    Do you/your family use sunscreen? Yes    Do you/your family keep firearms in the home? No    Do you/your family have a smoke detector(s)? Yes        Reviewed cmckim lpn 8-         Social Determinants of Health     Financial Resource Strain: Low Risk  (8/6/2024)    Financial Resource Strain     Within the past 12 months, have you or your family members you live with been unable to get utilities (heat, electricity) when it was really needed?: No   Food Insecurity: Low Risk  (8/6/2024)    Food Insecurity     Within the past 12 months, did you worry that your food would run out before you got money to buy more?: No     Within the past 12 months, did the food you bought just not last and you didn t have money to get more?: No   Transportation Needs: Low Risk  (8/6/2024)    Transportation Needs     Within the past 12 months, has lack of transportation kept you from medical appointments, getting  your medicines, non-medical meetings or appointments, work, or from getting things that you need?: No   Physical Activity: Sufficiently Active (8/6/2024)    Exercise Vital Sign     Days of Exercise per Week: 7 days     Minutes of Exercise per Session: 90 min   Stress: Stress Concern Present (8/6/2024)    [employer] [place] [employer] - Occupational Stress Questionnaire     Feeling of Stress : Rather much   Social Connections: Unknown (8/6/2024)    Social Connection and Isolation Panel [NHANES]     Frequency of Communication with Friends and Family: Not on file     Frequency of Social Gatherings with Friends and Family: Once a week     Attends Anabaptism Services: Not on file     Active Member of Clubs or Organizations: Not on file     Attends Club or Organization Meetings: Not on file     Marital Status: Not on file   Interpersonal Safety: Low Risk  (8/9/2024)    Interpersonal Safety     Do you feel physically and emotionally safe where you currently live?: Yes     Within the past 12 months, have you been hit, slapped, kicked or otherwise physically hurt by someone?: No     Within the past 12 months, have you been humiliated or emotionally abused in other ways by your partner or ex-partner?: No   Housing Stability: Low Risk  (8/6/2024)    Housing Stability     Do you have housing? : Yes     Are you worried about losing your housing?: No           8/6/2024   General Health   How would you rate your overall physical health? Good   Feel stress (tense, anxious, or unable to sleep) Rather much      (!) STRESS CONCERN      8/6/2024   Nutrition   Three or more servings of calcium each day? (!) I DON'T KNOW   Diet: Regular (no restrictions)   How many servings of fruit and vegetables per day? (!) 2-3   How many sweetened beverages each day? 0-1            8/6/2024   Exercise   Days per week of moderate/strenous exercise 7 days   Average minutes spent exercising at this level 90 min            8/6/2024   Social  Factors   Frequency of gathering with friends or relatives Once a week   Worry food won't last until get money to buy more No   Food not last or not have enough money for food? No   Do you have housing? (Housing is defined as stable permanent housing and does not include staying ouside in a car, in a tent, in an abandoned building, in an overnight shelter, or couch-surfing.) Yes   Are you worried about losing your housing? No   Lack of transportation? No   Unable to get utilities (heat,electricity)? No            8/6/2024   Dental   Dentist two times every year? Yes            8/6/2024   TB Screening   Were you born outside of the [place]? No              Today's PHQ-2 Score:       1/10/2024     3:44 PM   PHQ-2 ( 1999 [employer])   Q1: Little interest or pleasure in doing things 0   Q2: Feeling down, depressed or hopeless 0   PHQ-2 Score 0   Q1: Little interest or pleasure in doing things Not at all   Q2: Feeling down, depressed or hopeless Not at all   PHQ-2 Score 0         8/6/2024   Substance Use   Alcohol more than 3/day or more than 7/wk No   Do you use any other substances recreationally? No        Social History     Tobacco Use    Smoking status: Every Day     Types: Other    Smokeless tobacco: Never    Tobacco comments:     Medical Marijuana   Vaping Use    Vaping status: Never Used   Substance Use Topics    Alcohol use: Yes     Comment: social-1 glass of wine per week    Drug use: Yes     Types: Marijuana               8/6/2024   One time HIV Screening   Previous HIV test? No          8/6/2024   STI Screening   New sexual partner(s) since last STI/HIV test? No        History of abnormal Pap smear: no        Latest Ref Rng & Units 8/19/2022     1:50 PM 8/19/2019    10:27 AM 6/3/2016    12:00 AM   PAP / HPV   PAP  Negative for Intraepithelial Lesion or Malignancy (NILM)      PAP (Historical)   NIL  NIL    HPV 16 DNA Negative Negative      HPV 18 DNA Negative Negative      Other HR HPV Negative Negative               "8/6/2024   Contraception/Family Planning   Questions about contraception or family planning No           Reviewed and updated as needed this visit by Provider                         Objective    Exam  /68 (BP Location: Right arm, Patient Position: Sitting, Cuff Size: Adult Regular)   Pulse 57   Resp 14   Ht 1.549 m (5' 1\")   Wt 44.4 kg (97 lb 12.8 oz)   LMP 07/22/2024   SpO2 100%   Breastfeeding No   BMI 18.48 kg/m     Estimated body mass index is 18.48 kg/m  as calculated from the following:    Height as of this encounter: 1.549 m (5' 1\").    Weight as of this encounter: 44.4 kg (97 lb 12.8 oz).    Physical Exam  GENERAL: alert and no distress  EYES: Eyes grossly normal to inspection, PERRL and conjunctivae and sclerae normal  HENT: ear canals and TM's normal, nose and mouth without ulcers or lesions  NECK: no adenopathy, no asymmetry, masses, or scars  RESP: lungs clear to auscultation - no rales, rhonchi or wheezes  CV: regular rate and rhythm, normal S1 S2, no S3 or S4, no murmur, click or rub, no peripheral edema  ABDOMEN: soft, nontender, no hepatosplenomegaly, no masses and bowel sounds normal  MS: no gross musculoskeletal defects noted, no edema  SKIN: no suspicious lesions or rashes  NEURO: Normal strength and tone, mentation intact and speech normal  PSYCH: mentation appears normal, affect normal/bright        Signed Electronically by: Baldemar Mercado MD    "

## 2024-08-09 NOTE — PATIENT INSTRUCTIONS
Patient Education   Preventive Care Advice   This is general advice given by our system to help you stay healthy. However, your care team may have specific advice just for you. Please talk to your care team about your preventive care needs.  Nutrition  Eat 5 or more servings of fruits and vegetables each day.  Try wheat bread, brown rice and whole grain pasta (instead of white bread, rice, and pasta).  Get enough calcium and vitamin D. Check the label on foods and aim for 100% of the RDA (recommended daily allowance).  Lifestyle  Exercise at least 150 minutes each week  (30 minutes a day, 5 days a week).  Do muscle strengthening activities 2 days a week. These help control your weight and prevent disease.  No smoking.  Wear sunscreen to prevent skin cancer.  Have a dental exam and cleaning every 6 months.  Yearly exams  See your health care team every year to talk about:  Any changes in your health.  Any medicines your care team has prescribed.  Preventive care, family planning, and ways to prevent chronic diseases.  Shots (vaccines)   HPV shots (up to age 26), if you've never had them before.  Hepatitis B shots (up to age 59), if you've never had them before.  COVID-19 shot: Get this shot when it's due.  Flu shot: Get a flu shot every year.  Tetanus shot: Get a tetanus shot every 10 years.  Pneumococcal, hepatitis A, and RSV shots: Ask your care team if you need these based on your risk.  Shingles shot (for age 50 and up)  General health tests  Diabetes screening:  Starting at age 35, Get screened for diabetes at least every 3 years.  If you are younger than age 35, ask your care team if you should be screened for diabetes.  Cholesterol test: At age 39, start having a cholesterol test every 5 years, or more often if advised.  Bone density scan (DEXA): At age 50, ask your care team if you should have this scan for osteoporosis (brittle bones).  Hepatitis C: Get tested at least once in your life.  STIs (sexually  transmitted infections)  Before age 24: Ask your care team if you should be screened for STIs.  After age 24: Get screened for STIs if you're at risk. You are at risk for STIs (including HIV) if:  You are sexually active with more than one person.  You don't use condoms every time.  You or a partner was diagnosed with a sexually transmitted infection.  If you are at risk for HIV, ask about PrEP medicine to prevent HIV.  Get tested for HIV at least once in your life, whether you are at risk for HIV or not.  Cancer screening tests  Cervical cancer screening: If you have a cervix, begin getting regular cervical cancer screening tests starting at age 21.  Breast cancer scan (mammogram): If you've ever had breasts, begin having regular mammograms starting at age 40. This is a scan to check for breast cancer.  Colon cancer screening: It is important to start screening for colon cancer at age 45.  Have a colonoscopy test every 10 years (or more often if you're at risk) Or, ask your provider about stool tests like a FIT test every year or Cologuard test every 3 years.  To learn more about your testing options, visit:   .  For help making a decision, visit:   https://bit.ly/kv79549.  Prostate cancer screening test: If you have a prostate, ask your care team if a prostate cancer screening test (PSA) at age 55 is right for you.  Lung cancer screening: If you are a current or former smoker ages 50 to 80, ask your care team if ongoing lung cancer screenings are right for you.  For informational purposes only. Not to replace the advice of your health care provider. Copyright   2023 [facility]. All rights reserved. Clinically reviewed by the [facility]. [employer] 429935 - REV 01/24.  Learning About Stress  What is stress?     Stress is your body's response to a hard situation. Your body can have a physical, emotional, or mental response. Stress is a fact of life for most people, and it  affects everyone differently. What causes stress for you may not be stressful for someone else.  A lot of things can cause stress. You may feel stress when you go on a job interview, take a test, or run a race. This kind of short-term stress is normal and even useful. It can help you if you need to work hard or react quickly. For example, stress can help you finish an important job on time.  Long-term stress is caused by ongoing stressful situations or events. Examples of long-term stress include long-term health problems, ongoing problems at work, or conflicts in your family. Long-term stress can harm your health.  How does stress affect your health?  When you are stressed, your body responds as though you are in danger. It makes hormones that speed up your heart, make you breathe faster, and give you a burst of energy. This is called the fight-or-flight stress response. If the stress is over quickly, your body goes back to normal and no harm is done.  But if stress happens too often or lasts too long, it can have bad effects. Long-term stress can make you more likely to get sick, and it can make symptoms of some diseases worse. If you tense up when you are stressed, you may develop neck, shoulder, or low back pain. Stress is linked to high blood pressure and heart disease.  Stress also harms your emotional health. It can make you hayes, tense, or depressed. Your relationships may suffer, and you may not do well at work or school.  What can you do to manage stress?  You can try these things to help manage stress:   Do something active. Exercise or activity can help reduce stress. Walking is a great way to get started. Even everyday activities such as housecleaning or yard work can help.  Try yoga or otf chi. These techniques combine exercise and meditation. You may need some training at first to learn them.  Do something you enjoy. For example, listen to music or go to a movie. Practice your hobby or do volunteer  "work.  Meditate. This can help you relax, because you are not worrying about what happened before or what may happen in the future.  Do guided imagery. Imagine yourself in any setting that helps you feel calm. You can use online videos, books, or a teacher to guide you.  Do breathing exercises. For example:  From a standing position, bend forward from the waist with your knees slightly bent. Let your arms dangle close to the floor.  Breathe in slowly and deeply as you return to a standing position. Roll up slowly and lift your head last.  Hold your breath for just a few seconds in the standing position.  Breathe out slowly and bend forward from the waist.  Let your feelings out. Talk, laugh, cry, and express anger when you need to. Talking with supportive friends or family, a counselor, or a luke leader about your feelings is a healthy way to relieve stress. Avoid discussing your feelings with people who make you feel worse.  Write. It may help to write about things that are bothering you. This helps you find out how much stress you feel and what is causing it. When you know this, you can find better ways to cope.  What can you do to prevent stress?  You might try some of these things to help prevent stress:  Manage your time. This helps you find time to do the things you want and need to do.  Get enough sleep. Your body recovers from the stresses of the day while you are sleeping.  Get support. Your family, friends, and community can make a difference in how you experience stress.  Limit your news feed. Avoid or limit time on social media or news that may make you feel stressed.  Do something active. Exercise or activity can help reduce stress. Walking is a great way to get started.  Where can you learn more?  Go to https://www.healthwise.net/patiented  Enter N032 in the search box to learn more about \"Learning About Stress.\"  Current as of: October 24, 2023               Content Version: 14.0    0409-7587 " [employer], [employer].   Care instructions adapted under license by your healthcare professional. If you have questions about a medical condition or this instruction, always ask your healthcare professional. [employer] disclaims any warranty or liability for your use of this information.